# Patient Record
Sex: FEMALE | Race: WHITE | Employment: FULL TIME | URBAN - METROPOLITAN AREA
[De-identification: names, ages, dates, MRNs, and addresses within clinical notes are randomized per-mention and may not be internally consistent; named-entity substitution may affect disease eponyms.]

---

## 2017-06-01 ENCOUNTER — OFFICE VISIT (OUTPATIENT)
Dept: OBGYN CLINIC | Age: 57
End: 2017-06-01

## 2017-06-01 ENCOUNTER — APPOINTMENT (OUTPATIENT)
Dept: MAMMOGRAPHY | Age: 57
End: 2017-06-01

## 2017-06-01 VITALS
WEIGHT: 142.6 LBS | BODY MASS INDEX: 26.92 KG/M2 | HEIGHT: 61 IN | RESPIRATION RATE: 18 BRPM | SYSTOLIC BLOOD PRESSURE: 130 MMHG | DIASTOLIC BLOOD PRESSURE: 76 MMHG

## 2017-06-01 DIAGNOSIS — Z01.419 ENCOUNTER FOR GYNECOLOGICAL EXAMINATION (GENERAL) (ROUTINE) WITHOUT ABNORMAL FINDINGS: Primary | ICD-10-CM

## 2017-06-01 DIAGNOSIS — Z12.31 ENCOUNTER FOR SCREENING MAMMOGRAM FOR MALIGNANT NEOPLASM OF BREAST: ICD-10-CM

## 2017-06-01 DIAGNOSIS — B00.9 HSV INFECTION: ICD-10-CM

## 2017-06-01 RX ORDER — VALACYCLOVIR HYDROCHLORIDE 1 G/1
TABLET, FILM COATED ORAL
Qty: 30 TAB | Refills: 0 | Status: SHIPPED | OUTPATIENT
Start: 2017-06-01 | End: 2018-07-03 | Stop reason: SDUPTHER

## 2017-06-01 RX ORDER — LEVOTHYROXINE SODIUM 25 UG/1
TABLET ORAL
COMMUNITY
End: 2021-07-27

## 2017-06-01 NOTE — PROGRESS NOTES
[facility]  [email or web address]  [phone]    Tin Kunz MD, [facility]       Annual Gynecologic Exam:  WWE s/p josesito  Chief Complaint   Patient presents with    Well Woman       Maira Escalante is a 61 y.o.  [facility]  female who is here for an annual exam.    She does not report additional concerns today. Menstrual status:  Her periods are absent with a history of a hysterectomy. Contraception:  The current method of family planning is a hysterectomy. Sexual history:  She does not reports new sexual partner(s) in the last year. Preventive Medicine History:  Her last annual GYN exam was about one year ago. Her most recent Pap smear was normal obtained 1 year(s) ago. Her most recent HR HPV screen was Negative obtained 1 year(s) ago. She reports she does not know have a history of LANE 2, 3 or cervical cancer. Breast health: The patient had her mammogram today in our office. She does have a first or second degree relative with a history of breast cancer. Bone health: [place] She does not have a history of osteopenia/osteoporosis. Osteoporosis family history updated: see FH. She is not currently taking calcium and vitamin D.       Past Medical History:   Diagnosis Date    Abnormal uterine bleeding     H/O mammogram 2016    negative    History of mammogram 13; 5/29/15    negative; Negative    HSV (herpes simplex virus) infection     oral     Hypothyroidism     Pap smear for cervical cancer screening 4/20/10,5/29/15    negative, HPV negative; Neg HPV + #16    Pap smear for cervical cancer screening 2016    negative, HPV negative    PUD (peptic ulcer disease)     stomach ulcer     Past Surgical History:   Procedure Laterality Date    HX COLPOSCOPY  6/25/15    Pathology benign with inflammation    HX GYN  10/2010    partial hysterectomy,bladder prolapse     HX HERNIA REPAIR      ventral hernia repair    HX OTHER SURGICAL      liver biopsy, benign    HX OTHER SURGICAL      Laparoscopy, X 2, ovarian cyst    HX OTHER SURGICAL  9/30/11    Fatty Lymphoma Excision     Family History   Problem Relation Age of Onset    Hypertension Mother     Other Father      ETOH    Breast Cancer Sister      age 39, metastatic, negative BRCA testing    Other Other      lymphoma, uncle     Social History   Substance Use Topics    Smoking status: Former Smoker    Smokeless tobacco: Never Used    Alcohol use 1.0 oz/week     1 Glasses of wine, 1 Shots of liquor per week      Comment: social     Allergies   Allergen Reactions    Aspirin Other (comments)     \"stomach pain\"    Erythromycin Rash     In childhood    Penicillins Rash     As a child    Tetracycline Nausea and Vomiting     Current Outpatient Prescriptions   Medication Sig    levothyroxine (SYNTHROID) 25 mcg tablet Take  by mouth Daily (before breakfast).  valACYclovir (VALTREX) 1 gram tablet Take one tablet by mouth every 12 hours    multivitamin, stress formula (STRESS TAB) tablet Take 1 Tab by mouth daily.  LINACLOTIDE (LINZESS PO) Take  by mouth.  PV W-O BENITEZ/FERROUS FUMARATE/FA (M-VIT PO) Take 1 Tab by mouth daily. No current facility-administered medications for this visit. Tobacco History:  reports that she has quit smoking. She has never used smokeless tobacco.  Alcohol Abuse:  reports that she drinks about 1.0 oz of alcohol per week   Drug Abuse:  reports that she does not use illicit drugs.     Patient Active Problem List   Diagnosis Code    Meniscus tear S83.209A       Review of Systems - History obtained from the patient  Constitutional: negative for weight loss, fever, night sweats  HEENT: negative for hearing loss, earache, congestion, snoring, sorethroat  CV: negative for chest pain, palpitations, edema  Resp: negative for cough, shortness of breath, wheezing  GI: negative for change in bowel habits, abdominal pain, black or bloody stools  : negative for frequency, dysuria, hematuria, vaginal discharge  MSK: negative for back pain, joint pain, muscle pain  Breast: negative for breast lumps, nipple discharge, galactorrhea  Skin :negative for itching, rash, hives  Neuro: negative for dizziness, headache, confusion, weakness  Psych: negative for anxiety, depression, change in mood  Heme/lymph: negative for bleeding, bruising, pallor    Physical Exam  Visit Vitals    /76 (BP 1 Location: Left arm, BP Patient Position: Sitting)    Resp 18    Ht 5' 1\" (1.549 m)    Wt 142 lb 9.6 oz (64.7 kg)    BMI 26.94 kg/m2       Constitutional  · Appearance: well-nourished, well developed, alert, in no acute distress    HENT  · Head and Face: appears normal    Neck  · Inspection/Palpation: normal appearance, no masses or tenderness  · Lymph Nodes: no lymphadenopathy present  · Thyroid: gland size normal, nontender, no nodules or masses present on palpation    Chest  · Respiratory Effort: breathing labored  · Auscultation: normal breath sounds    Cardiovascular  · Heart:  · Auscultation: regular rate and rhythm without murmur    Breasts  · Inspection of Breasts: breasts symmetrical, no skin changes, no discharge present, nipple appearance normal, no skin retraction present  · Palpation of Breasts and Axillae: no masses present on palpation, no breast tenderness  · Axillary Lymph Nodes: no lymphadenopathy present    Gastrointestinal  · Abdominal Examination: abdomen non-tender to palpation, normal bowel sounds, no masses present  · Liver and spleen: no hepatomegaly present, spleen not palpable  · Hernias: no hernias identified    Genitourinary  · External Genitalia: normal appearance for age, no discharge present, no tenderness present, no inflammatory lesions present, no masses present, no atrophy present  · Vagina: normal vaginal vault without central or paravaginal defects, no discharge present, no inflammatory lesions present, no masses present  · Bladder: non-tender to palpation  · Urethra: appears normal  · Cervix: present   · Uterus: absent  · Adnexa: no adnexal tenderness present, no adnexal masses present  · Perineum: perineum within normal limits, no evidence of trauma, no rashes or skin lesions present  · Anus: anus within normal limits, no hemorrhoids present  · Inguinal Lymph Nodes: no lymphadenopathy present    Skin  · General Inspection: no rash, no lesions identified    Neurologic/Psychiatric  · Mental Status:  · Orientation: grossly oriented to person, place and time  · Mood and Affect: mood normal, affect appropriate    Assessment:  Encounter Diagnoses   Name Primary?  HSV infection     Encounter for gynecological examination (general) (routine) without abnormal findings Yes       Plan:  The patient was counseled about diet, exercise, healthy lifestyle  We recommended the patient return for yearly wellness visits  We discussed self breast exam  We discussed safer sex practices, condom use and risk factors for sexually transmitted diseases. We discussed current pap smear and HR HPV testing guidelines. Handouts were given to the patient. She is instructed to contact our office with any questions or concerns  We recommend repeating her annual exam in 1 year or following up sooner if needed  Handouts were given to the patient  We recommend follow up with a primary care physician for chronic medical problems and evaluation of non-gynecologic concerns. Orders Placed This Encounter    valACYclovir (VALTREX) 1 gram tablet    PAP IG, HPV AND RFX HPV 30/21,79([id number])       Results for orders placed or performed in visit on 06/01/17   [facility] MAMMO BI SCREENING INCL CAD    Narrative    STUDY: Bilateral digital screening mammogram    INDICATION:  Screening. COMPARISON:  [id number]    BREAST COMPOSITION:  There are scattered areas of fibroglandular density.     FINDINGS: Bilateral digital screening mammography was performed and is  interpreted in conjunction with a computer assisted detection (CAD) system. No  suspicious masses or calcifications are identified. There has been no  significant change. Impression    IMPRESSION:  BI-RADS 1: Negative. No mammographic evidence of malignancy. RECOMMENDATIONS:  Next screening mammogram is recommended in one year. The patient will be notified of these results.

## 2017-06-01 NOTE — PATIENT INSTRUCTIONS
Well Visit, Women 48 to 72: Care Instructions  Your Care Instructions  Physical exams can help you stay healthy. Your doctor has checked your overall health and may have suggested ways to take good care of yourself. He or she also may have recommended tests. At home, you can help prevent illness with healthy eating, regular exercise, and other steps. Follow-up care is a key part of your treatment and safety. Be sure to make and go to all appointments, and call your doctor if you are having problems. It's also a good idea to know your test results and keep a list of the medicines you take. How can you care for yourself at home? · Reach and stay at a healthy weight. This will lower your risk for many problems, such as obesity, diabetes, heart disease, and high blood pressure. · Get at least 30 minutes of exercise on most days of the week. Walking is a good choice. You also may want to do other activities, such as running, swimming, cycling, or playing tennis or team sports. · Do not smoke. Smoking can make health problems worse. If you need help quitting, talk to your doctor about stop-smoking programs and medicines. These can increase your chances of quitting for good. · Protect your skin from too much sun. When you're outdoors from 10 a.m. to 4 p.m., stay in the shade or cover up with clothing and a hat with a wide brim. Wear sunglasses that block UV rays. Even when it's cloudy, put broad-spectrum sunscreen (SPF 30 or higher) on any exposed skin. · See a dentist one or two times a year for checkups and to have your teeth cleaned. · Wear a seat belt in the car. · Limit alcohol to 1 drink a day. Too much alcohol can cause health problems. Follow your doctor's advice about when to have certain tests. These tests can spot problems early. · Cholesterol.  Your doctor will tell you how often to have this done based on your age, family history, or other things that can increase your risk for heart attack and stroke. · Blood pressure. Have your blood pressure checked during a routine doctor visit. Your doctor will tell you how often to check your blood pressure based on your age, your blood pressure results, and other factors. · Mammogram. Ask your doctor how often you should have a mammogram, which is an X-ray of your breasts. A mammogram can spot breast cancer before it can be felt and when it is easiest to treat. · Pap test and pelvic exam. Ask your doctor how often you should have a Pap test. You may not need to have a Pap test as often as you used to. · Vision. Have your eyes checked every year or two or as often as your doctor suggests. Some experts recommend that you have yearly exams for glaucoma and other age-related eye problems starting at age 48. · Hearing. Tell your doctor if you notice any change in your hearing. You can have tests to find out how well you hear. · Diabetes. Ask your doctor whether you should have tests for diabetes. · Colon cancer. You should begin tests for colon cancer at age 48. You may have one of several tests. Your doctor will tell you how often to have tests based on your age and risk. Risks include whether you already had a precancerous polyp removed from your colon or whether your parents, sisters and brothers, or children have had colon cancer. · Thyroid disease. Talk to your doctor about whether to have your thyroid checked as part of a regular physical exam. Women have an increased chance of a thyroid problem. · Osteoporosis. You should begin tests for bone density at age 72. If you are younger than 72, ask your doctor whether you have factors that may increase your risk for this disease. You may want to have this test before age 72. · Heart attack and stroke risk. At least every 4 to 6 years, you should have your risk for heart attack and stroke assessed.  Your doctor uses factors such as your age, blood pressure, cholesterol, and whether you smoke or have diabetes to show what your risk for a heart attack or stroke is over the next 10 years. When should you call for help? Watch closely for changes in your health, and be sure to contact your doctor if you have any problems or symptoms that concern you. Where can you learn more? Go to http://shanna-zoe.info/. Enter L050 in the search box to learn more about \"Well Visit, Women 50 to 72: Care Instructions. \"  Current as of: July 19, 2016  Content Version: 11.2  © 8373-0577 [employer]. Care instructions adapted under license by [employer] (which disclaims liability or warranty for this information). If you have questions about a medical condition or this instruction, always ask your healthcare professional. [address] any warranty or liability for your use of this information.

## 2017-07-13 LAB
CYTOLOGIST CVX/VAG CYTO: NORMAL
CYTOLOGY CVX/VAG DOC THIN PREP: NORMAL
CYTOLOGY HISTORY:: NORMAL
DX ICD CODE: NORMAL
HPV I/H RISK 1 DNA CVX QL PROBE+SIG AMP: NEGATIVE
Lab: NORMAL
OTHER STN SPEC: NORMAL
PATH REPORT.FINAL DX SPEC: NORMAL
STAT OF ADQ CVX/VAG CYTO-IMP: NORMAL

## 2018-07-03 ENCOUNTER — OFFICE VISIT (OUTPATIENT)
Dept: OBGYN CLINIC | Age: 58
End: 2018-07-03

## 2018-07-03 VITALS
WEIGHT: 148 LBS | DIASTOLIC BLOOD PRESSURE: 82 MMHG | BODY MASS INDEX: 27.94 KG/M2 | SYSTOLIC BLOOD PRESSURE: 140 MMHG | HEIGHT: 61 IN

## 2018-07-03 DIAGNOSIS — B00.9 HSV INFECTION: ICD-10-CM

## 2018-07-03 DIAGNOSIS — Z01.419 ENCOUNTER FOR GYNECOLOGICAL EXAMINATION (GENERAL) (ROUTINE) WITHOUT ABNORMAL FINDINGS: Primary | ICD-10-CM

## 2018-07-03 DIAGNOSIS — R03.0 ELEVATED BLOOD PRESSURE READING: ICD-10-CM

## 2018-07-03 RX ORDER — VALACYCLOVIR HYDROCHLORIDE 1 G/1
TABLET, FILM COATED ORAL
Qty: 30 TAB | Refills: 0 | Status: SHIPPED | OUTPATIENT
Start: 2018-07-03 | End: 2020-07-21 | Stop reason: SDUPTHER

## 2018-07-03 RX ORDER — THYROID, PORCINE 15 MG/1
TABLET ORAL
COMMUNITY
Start: 2018-07-01 | End: 2021-07-27

## 2018-07-03 RX ORDER — VALSARTAN 80 MG/1
TABLET ORAL
Refills: 5 | COMMUNITY
Start: 2018-06-23 | End: 2021-07-27

## 2018-07-03 NOTE — LETTER
7/3/2018 2:26 PM 
 
Ms. Arben Luis [address] [id number] To Whom it may concern; Arben Luis is under my care. She was seen in our office today for her annual well women exam and mammogram. 
 
Following this appointment today, she may return to work. Sincerely, Ruddy Sanz MD

## 2018-07-03 NOTE — MR AVS SNAPSHOT
[address] [facility] [address] [facility] 
[phone] Patient: Ramón Vicente MRN: [id number] :1960 Visit Information Date & Time Provider Department Dept. Phone Encounter #  
 7/3/2018  1:40 PM Rosio Basurto MD Ar[facility] Little [phone] [id number] Follow-up Instructions Return in about 1 year (around 7/3/2019), or if symptoms worsen or fail to improve, for Annual exam. Upcoming Health Maintenance Date Due Hepatitis C Screening 1960 FOBT Q 1 YEAR AGE 50-75 2009 BREAST CANCER SCRN MAMMOGRAM 2018 Influenza Age 5 to Adult 2018 PAP AKA CERVICAL CYTOLOGY 2020 Allergies as of 7/3/2018  Review Complete On: 7/3/2018 By: Rosio Basurto MD  
  
 Severity Noted Reaction Type Reactions Aspirin  2011    Other (comments) \"stomach pain\" Erythromycin  2011    Rash In childhood Penicillins  2011    Rash As a child Tetracycline  2011    Nausea and Vomiting Current Immunizations  Never Reviewed No immunizations on file. Not reviewed this visit You Were Diagnosed With   
  
 Codes Comments Encounter for gynecological examination (general) (routine) without abnormal findings    -  Primary ICD-10-CM: E95.945 ICD-9-CM: V72.31   
 HSV infection     ICD-10-CM: B00.9 ICD-9-CM: 054.9 Vitals BP Height(growth percentile) Weight(growth percentile) BMI OB Status Smoking Status 140/82 5' 1\" (1.549 m) 148 lb (67.1 kg) 27.96 kg/m2 Hysterectomy Former Smoker BMI and BSA Data Body Mass Index Body Surface Area  
 27.96 kg/m 2 1.7 m 2 Preferred Pharmacy Pharmacy Name Phone [id number] [place] [facility], [address] [phone] Your Updated Medication List  
  
   
This list is accurate as of 7/3/18  2:08 PM.  Always use your most recent med list.  
  
  
  
  
 BERNADETTE THYROID 15 mg tablet Generic drug:  thyroid (Pork) levothyroxine 25 mcg tablet Commonly known as:  SYNTHROID Take  by mouth Daily (before breakfast). LINZESS PO Take  by mouth. M-VIT PO Take 1 Tab by mouth daily. multivitamin, stress formula tablet Commonly known as:  STRESS TAB Take 1 Tab by mouth daily. valACYclovir 1 gram tablet Commonly known as:  VALTREX Take one tablet by mouth every 12 hours  
  
 valsartan 80 mg tablet Commonly known as:  DIOVAN  
TAKE 1 TABLET BY MOUTH EVERY DAY Prescriptions Sent to Pharmacy Refills  
 valACYclovir (VALTREX) 1 gram tablet 0 Sig: Take one tablet by mouth every 12 hours Class: Normal  
 Pharmacy: [address] [place] [address], [address] [place] [address] Road Ph #: [phone] We Performed the Following PAP IG, HPV AND RFX HPV 47/71,35([id number]) [[id number] Custom] Follow-up Instructions Return in about 1 year (around 7/3/2019), or if symptoms worsen or fail to improve, for Annual exam.  
  
  
Patient Instructions Well Visit, Women 48 to 72: Care Instructions Your Care Instructions Physical exams can help you stay healthy. Your doctor has checked your overall health and may have suggested ways to take good care of yourself. He or she also may have recommended tests. At home, you can help prevent illness with healthy eating, regular exercise, and other steps. Follow-up care is a key part of your treatment and safety. Be sure to make and go to all appointments, and call your doctor if you are having problems. It's also a good idea to know your test results and keep a list of the medicines you take. How can you care for yourself at home? · Reach and stay at a healthy weight. This will lower your risk for many problems, such as obesity, diabetes, heart disease, and high blood pressure. · Get at least 30 minutes of exercise on most days of the week.  Walking is a good choice. You also may want to do other activities, such as running, swimming, cycling, or playing tennis or team sports. · Do not smoke. Smoking can make health problems worse. If you need help quitting, talk to your doctor about stop-smoking programs and medicines. These can increase your chances of quitting for good. · Protect your skin from too much sun. When you're outdoors from 10 a.m. to 4 p.m., stay in the shade or cover up with clothing and a hat with a wide brim. Wear sunglasses that block UV rays. Even when it's cloudy, put broad-spectrum sunscreen (SPF 30 or higher) on any exposed skin. · See a dentist one or two times a year for checkups and to have your teeth cleaned. · Wear a seat belt in the car. · Limit alcohol to 1 drink a day. Too much alcohol can cause health problems. Follow your doctor's advice about when to have certain tests. These tests can spot problems early. · Cholesterol. Your doctor will tell you how often to have this done based on your age, family history, or other things that can increase your risk for heart attack and stroke. · Blood pressure. Have your blood pressure checked during a routine doctor visit. Your doctor will tell you how often to check your blood pressure based on your age, your blood pressure results, and other factors. · Mammogram. Ask your doctor how often you should have a mammogram, which is an X-ray of your breasts. A mammogram can spot breast cancer before it can be felt and when it is easiest to treat. · Pap test and pelvic exam. Ask your doctor how often you should have a Pap test. You may not need to have a Pap test as often as you used to. · Vision. Have your eyes checked every year or two or as often as your doctor suggests. Some experts recommend that you have yearly exams for glaucoma and other age-related eye problems starting at age 48. · Hearing. Tell your doctor if you notice any change in your hearing.  You can have tests to find out how well you hear. · Diabetes. Ask your doctor whether you should have tests for diabetes. · Colon cancer. You should begin tests for colon cancer at age 48. You may have one of several tests. Your doctor will tell you how often to have tests based on your age and risk. Risks include whether you already had a precancerous polyp removed from your colon or whether your parents, sisters and brothers, or children have had colon cancer. · Thyroid disease. Talk to your doctor about whether to have your thyroid checked as part of a regular physical exam. Women have an increased chance of a thyroid problem. · Osteoporosis. You should begin tests for bone density at age 72. If you are younger than 72, ask your doctor whether you have factors that may increase your risk for this disease. You may want to have this test before age 72. · Heart attack and stroke risk. At least every 4 to 6 years, you should have your risk for heart attack and stroke assessed. Your doctor uses factors such as your age, blood pressure, cholesterol, and whether you smoke or have diabetes to show what your risk for a heart attack or stroke is over the next 10 years. When should you call for help? Watch closely for changes in your health, and be sure to contact your doctor if you have any problems or symptoms that concern you. Where can you learn more? Go to http://shanna-zoe.info/. Enter X743 in the search box to learn more about \"Well Visit, Women 50 to 72: Care Instructions. \" Current as of: May 12, 2017 Content Version: 11.4 © 2972-3389 [employer]. Care instructions adapted under license by [employer] (which disclaims liability or warranty for this information).  If you have questions about a medical condition or this instruction, always ask your healthcare professional. [address] [address] any warranty or liability for your use of this information. Introducing [facility]! Dear [facility]: Thank you for requesting a [employer] account. Our records indicate that you already have an active [employer] account. You can access your account anytime at [email or web address] Did you know that you can access your hospital and ER discharge instructions at any time in [employer]? You can also review all of your test results from your hospital stay or ER visit. Additional Information If you have questions, please visit the Frequently Asked Questions section of the [employer] website at [email or web address] Remember, [employer] is NOT to be used for urgent needs. For medical emergencies, dial 911. Now available from your iPhone and Android! Please provide this summary of care documentation to your next provider. Your primary care clinician is listed as NONE. If you have any questions after today's visit, please call [phone].

## 2018-07-03 NOTE — PATIENT INSTRUCTIONS
Well Visit, Women 48 to 72: Care Instructions  Your Care Instructions    Physical exams can help you stay healthy. Your doctor has checked your overall health and may have suggested ways to take good care of yourself. He or she also may have recommended tests. At home, you can help prevent illness with healthy eating, regular exercise, and other steps. Follow-up care is a key part of your treatment and safety. Be sure to make and go to all appointments, and call your doctor if you are having problems. It's also a good idea to know your test results and keep a list of the medicines you take. How can you care for yourself at home? · Reach and stay at a healthy weight. This will lower your risk for many problems, such as obesity, diabetes, heart disease, and high blood pressure. · Get at least 30 minutes of exercise on most days of the week. Walking is a good choice. You also may want to do other activities, such as running, swimming, cycling, or playing tennis or team sports. · Do not smoke. Smoking can make health problems worse. If you need help quitting, talk to your doctor about stop-smoking programs and medicines. These can increase your chances of quitting for good. · Protect your skin from too much sun. When you're outdoors from 10 a.m. to 4 p.m., stay in the shade or cover up with clothing and a hat with a wide brim. Wear sunglasses that block UV rays. Even when it's cloudy, put broad-spectrum sunscreen (SPF 30 or higher) on any exposed skin. · See a dentist one or two times a year for checkups and to have your teeth cleaned. · Wear a seat belt in the car. · Limit alcohol to 1 drink a day. Too much alcohol can cause health problems. Follow your doctor's advice about when to have certain tests. These tests can spot problems early. · Cholesterol.  Your doctor will tell you how often to have this done based on your age, family history, or other things that can increase your risk for heart attack and stroke. · Blood pressure. Have your blood pressure checked during a routine doctor visit. Your doctor will tell you how often to check your blood pressure based on your age, your blood pressure results, and other factors. · Mammogram. Ask your doctor how often you should have a mammogram, which is an X-ray of your breasts. A mammogram can spot breast cancer before it can be felt and when it is easiest to treat. · Pap test and pelvic exam. Ask your doctor how often you should have a Pap test. You may not need to have a Pap test as often as you used to. · Vision. Have your eyes checked every year or two or as often as your doctor suggests. Some experts recommend that you have yearly exams for glaucoma and other age-related eye problems starting at age 48. · Hearing. Tell your doctor if you notice any change in your hearing. You can have tests to find out how well you hear. · Diabetes. Ask your doctor whether you should have tests for diabetes. · Colon cancer. You should begin tests for colon cancer at age 48. You may have one of several tests. Your doctor will tell you how often to have tests based on your age and risk. Risks include whether you already had a precancerous polyp removed from your colon or whether your parents, sisters and brothers, or children have had colon cancer. · Thyroid disease. Talk to your doctor about whether to have your thyroid checked as part of a regular physical exam. Women have an increased chance of a thyroid problem. · Osteoporosis. You should begin tests for bone density at age 72. If you are younger than 72, ask your doctor whether you have factors that may increase your risk for this disease. You may want to have this test before age 72. · Heart attack and stroke risk. At least every 4 to 6 years, you should have your risk for heart attack and stroke assessed.  Your doctor uses factors such as your age, blood pressure, cholesterol, and whether you smoke or have diabetes to show what your risk for a heart attack or stroke is over the next 10 years. When should you call for help? Watch closely for changes in your health, and be sure to contact your doctor if you have any problems or symptoms that concern you. Where can you learn more? Go to http://shanna-zoe.info/. Enter W146 in the search box to learn more about \"Well Visit, Women 50 to 72: Care Instructions. \"  Current as of: May 12, 2017  Content Version: 11.4  © 2820-2091 Healthwise, Incorporated. Care instructions adapted under license by [employer] (which disclaims liability or warranty for this information). If you have questions about a medical condition or this instruction, always ask your healthcare professional. [address] any warranty or liability for your use of this information.

## 2018-07-05 ENCOUNTER — TELEPHONE (OUTPATIENT)
Dept: OBGYN CLINIC | Age: 58
End: 2018-07-05

## 2018-07-05 DIAGNOSIS — R92.8 ABNORMAL MAMMOGRAM OF LEFT BREAST: Primary | ICD-10-CM

## 2018-07-05 NOTE — TELEPHONE ENCOUNTER
----- Message from Rocio North MD sent at 7/3/2018  5:47 PM EDT -----  Abnormal, needs follow up. Tickle. Update FS. Notify patient if not done by radiology.

## 2018-07-05 NOTE — TELEPHONE ENCOUNTER
Patient aware of results and MD recommendations by phone. Patient was explained the abnormality of the mammogram and what the next steps to take. Patient wants to contact her insurance company to make sure that this diagnostic mammogram and ultrasound is covered and then I provided the patient the  Coordination of Care scheduling number and alerted the patient that she would need to have the mammogram done at the [address] [place] [address]. I also advised the patient that I would place the needed orders in her chart so that when she calls to schedule the appointment, the orders would be seen and noted. Patient appreciative of the help. Orders placed within the patient's chart.

## 2018-07-09 ENCOUNTER — HOSPITAL ENCOUNTER (OUTPATIENT)
Dept: MAMMOGRAPHY | Age: 58
Discharge: HOME | End: 2018-07-09
Attending: OBSTETRICS & GYNECOLOGY
Payer: COMMERCIAL

## 2018-07-09 DIAGNOSIS — R92.8 ABNORMAL MAMMOGRAM OF LEFT BREAST: ICD-10-CM

## 2018-07-09 DIAGNOSIS — R92.8 ABNORMAL MAMMOGRAM: ICD-10-CM

## 2018-07-09 PROCEDURE — 76642 ULTRASOUND BREAST LIMITED: CPT

## 2018-07-09 PROCEDURE — 77065 DX MAMMO INCL CAD UNI: CPT

## 2018-07-09 NOTE — PROGRESS NOTES
Abnormal results: breast cyst an 8 mm x 4 mm x 6 mm  cyst, correlating with mammographic abnormality. Left US  Update chart (if needed).

## 2018-10-29 ENCOUNTER — HOSPITAL ENCOUNTER (OUTPATIENT)
Dept: MAMMOGRAPHY | Age: 58
Discharge: HOME | End: 2018-10-29
Attending: INTERNAL MEDICINE
Payer: COMMERCIAL

## 2018-10-29 DIAGNOSIS — Z78.0 ASYMPTOMATIC MENOPAUSAL STATE: ICD-10-CM

## 2018-10-29 PROCEDURE — 77080 DXA BONE DENSITY AXIAL: CPT

## 2019-07-16 ENCOUNTER — OFFICE VISIT (OUTPATIENT)
Dept: OBGYN CLINIC | Age: 59
End: 2019-07-16

## 2019-07-16 VITALS
HEIGHT: 61 IN | DIASTOLIC BLOOD PRESSURE: 74 MMHG | WEIGHT: 154 LBS | BODY MASS INDEX: 29.07 KG/M2 | SYSTOLIC BLOOD PRESSURE: 126 MMHG

## 2019-07-16 DIAGNOSIS — Z01.419 ENCOUNTER FOR GYNECOLOGICAL EXAMINATION (GENERAL) (ROUTINE) WITHOUT ABNORMAL FINDINGS: Primary | ICD-10-CM

## 2019-07-16 DIAGNOSIS — Z71.1 WORRIED WELL: ICD-10-CM

## 2019-07-16 LAB
BILIRUB UR QL STRIP: NEGATIVE
GLUCOSE UR-MCNC: NEGATIVE MG/DL
KETONES P FAST UR STRIP-MCNC: NEGATIVE MG/DL
PH UR STRIP: 6.5 [PH] (ref 4.6–8)
PROT UR QL STRIP: NEGATIVE
SP GR UR STRIP: 1.02 (ref 1–1.03)
UA UROBILINOGEN AMB POC: NORMAL (ref 0.2–1)
URINALYSIS CLARITY POC: CLEAR
URINALYSIS COLOR POC: YELLOW
URINE BLOOD POC: NEGATIVE
URINE LEUKOCYTES POC: NEGATIVE
URINE NITRITES POC: NEGATIVE

## 2019-07-16 NOTE — PATIENT INSTRUCTIONS
Well Visit, Women 48 to 72: Care Instructions Your Care Instructions Physical exams can help you stay healthy. Your doctor has checked your overall health and may have suggested ways to take good care of yourself. He or she also may have recommended tests. At home, you can help prevent illness with healthy eating, regular exercise, and other steps. Follow-up care is a key part of your treatment and safety. Be sure to make and go to all appointments, and call your doctor if you are having problems. It's also a good idea to know your test results and keep a list of the medicines you take. How can you care for yourself at home? · Reach and stay at a healthy weight. This will lower your risk for many problems, such as obesity, diabetes, heart disease, and high blood pressure. · Get at least 30 minutes of exercise on most days of the week. Walking is a good choice. You also may want to do other activities, such as running, swimming, cycling, or playing tennis or team sports. · Do not smoke. Smoking can make health problems worse. If you need help quitting, talk to your doctor about stop-smoking programs and medicines. These can increase your chances of quitting for good. · Protect your skin from too much sun. When you're outdoors from 10 a.m. to 4 p.m., stay in the shade or cover up with clothing and a hat with a wide brim. Wear sunglasses that block UV rays. Even when it's cloudy, put broad-spectrum sunscreen (SPF 30 or higher) on any exposed skin. · See a dentist one or two times a year for checkups and to have your teeth cleaned. · Wear a seat belt in the car. · Limit alcohol to 1 drink a day. Too much alcohol can cause health problems. Follow your doctor's advice about when to have certain tests. These tests can spot problems early. · Cholesterol.  Your doctor will tell you how often to have this done based on your age, family history, or other things that can increase your risk for heart attack and stroke. · Blood pressure. Have your blood pressure checked during a routine doctor visit. Your doctor will tell you how often to check your blood pressure based on your age, your blood pressure results, and other factors. · Mammogram. Ask your doctor how often you should have a mammogram, which is an X-ray of your breasts. A mammogram can spot breast cancer before it can be felt and when it is easiest to treat. · Pap test and pelvic exam. Ask your doctor how often you should have a Pap test. You may not need to have a Pap test as often as you used to. · Vision. Have your eyes checked every year or two or as often as your doctor suggests. Some experts recommend that you have yearly exams for glaucoma and other age-related eye problems starting at age 48. · Hearing. Tell your doctor if you notice any change in your hearing. You can have tests to find out how well you hear. · Diabetes. Ask your doctor whether you should have tests for diabetes. · Colon cancer. You should begin tests for colon cancer at age 48. You may have one of several tests. Your doctor will tell you how often to have tests based on your age and risk. Risks include whether you already had a precancerous polyp removed from your colon or whether your parents, sisters and brothers, or children have had colon cancer. · Thyroid disease. Talk to your doctor about whether to have your thyroid checked as part of a regular physical exam. Women have an increased chance of a thyroid problem. · Osteoporosis. You should begin tests for bone density at age 72. If you are younger than 72, ask your doctor whether you have factors that may increase your risk for this disease. You may want to have this test before age 72. · Heart attack and stroke risk. At least every 4 to 6 years, you should have your risk for heart attack and stroke assessed.  Your doctor uses factors such as your age, blood pressure, cholesterol, and whether you smoke or have diabetes to show what your risk for a heart attack or stroke is over the next 10 years. When should you call for help? Watch closely for changes in your health, and be sure to contact your doctor if you have any problems or symptoms that concern you. Where can you learn more? Go to http://shanna-zoe.info/. Enter U930 in the search box to learn more about \"Well Visit, Women 50 to 72: Care Instructions. \" Current as of: March 28, 2018 Content Version: 11.9 © 6598-8655 Healthwise, Incorporated. Care instructions adapted under license by [employer] (which disclaims liability or warranty for this information). If you have questions about a medical condition or this instruction, always ask your healthcare professional. [address] any warranty or liability for your use of this information.

## 2019-07-16 NOTE — PROGRESS NOTES
[facility]  [email or web address]  [phone]    Agnieszka Resendez MD, [facility]       Annual Gynecologic Exam:  WWE s/p hyst  Chief Complaint   Patient presents with    Well Woman       Alireza Saxena is a 60 y.o.  [facility]  female who is here for an annual exam.    She does not report additional concerns today. Concerned about bulge in upper abdomen. Menstrual status:  Her periods are absent with a history of a hysterectomy. Contraception:  The current method of family planning is a hysterectomy. Sexual history:  She does not reports new sexual partner(s) in the last year. Preventive Medicine History:. Her most recent Pap smear was normal obtained 2018  Her most recent HR HPV screen was Negative obtained [id number]   She reports she does not have a history of [address], 3 or cervical cancer. Breast health: The patient had her mammogram today in our office. She does have a first or second degree relative with a history of breast cancer. Sister- metastatic Br Ca-  BRCA neg  Bone health: Evie Juarez She does have a history of osteopenia. Osteoporosis family history updated: see FH. She is currently taking calcium and vitamin D.       Past Medical History:   Diagnosis Date    Abnormal mammogram of left breast 2018; 18    add'l views MMG birads 2, left    Abnormal ultrasound of breast 2018    Left, breast cyst an 8 mm x 4 mm x 6 mm    Abnormal uterine bleeding     H/O mammogram 2016    negative    History of mammogram 13; 5/29/15; 17    negative; Negative; negative    HSV (herpes simplex virus) infection     oral     Hypothyroidism     Pap smear for cervical cancer screening 4/20/10,5/29/15    negative, HPV negative; Neg HPV + #16    Pap smear for cervical cancer screening 2016; 17; 7/3/18    negative, HPV negative; neg, hpv neg; Neg, HPV Neg    PUD (peptic ulcer disease)     stomach ulcer     Past Surgical History:   Procedure Laterality Date    HX COLPOSCOPY  6/25/15    Pathology benign with inflammation    HX GYN  10/2010    partial hysterectomy,bladder prolapse     HX HERNIA REPAIR  2003    ventral hernia repair    HX OTHER SURGICAL      liver biopsy, benign    HX OTHER SURGICAL      Laparoscopy, X 2, ovarian cyst    HX OTHER SURGICAL  9/30/11    Fatty Lymphoma Excision     Family History   Problem Relation Age of Onset    Hypertension Mother     Other Father         ETOH    Breast Cancer Sister         age 39, metastatic, negative BRCA testing    Other Other         lymphoma, uncle     Social History     Tobacco Use    Smoking status: Former Smoker    Smokeless tobacco: Never Used   Substance Use Topics    Alcohol use: Yes     Alcohol/week: 1.0 oz     Types: 1 Glasses of wine, 1 Shots of liquor per week     Comment: social    Drug use: No     Allergies   Allergen Reactions    Aspirin Other (comments)     \"stomach pain\"    Erythromycin Rash     In childhood    Penicillins Rash     As a child    Tetracycline Nausea and Vomiting     Current Outpatient Medications   Medication Sig    ARMOUR THYROID 15 mg tablet     valsartan (DIOVAN) 80 mg tablet TAKE 1 TABLET BY MOUTH EVERY DAY    valACYclovir (VALTREX) 1 gram tablet Take one tablet by mouth every 12 hours    levothyroxine (SYNTHROID) 25 mcg tablet Take  by mouth Daily (before breakfast).  multivitamin, stress formula (STRESS TAB) tablet Take 1 Tab by mouth daily.  LINACLOTIDE (LINZESS PO) Take  by mouth.  PV W-O BENITEZ/FERROUS FUMARATE/FA (M-VIT PO) Take 1 Tab by mouth daily. No current facility-administered medications for this visit. Tobacco History:  reports that she has quit smoking. She has never used smokeless tobacco.  Alcohol Abuse:  reports that she drinks about 1.0 oz of alcohol per week. Drug Abuse:  reports that she does not use drugs.     Patient Active Problem List   Diagnosis Code    Meniscus tear S83.209A    HSV infection B00.9       Review of Systems - History obtained from the patient  Constitutional: negative for weight loss, fever, night sweats  HEENT: negative for hearing loss, earache, congestion, snoring, sorethroat  CV: negative for chest pain, palpitations, edema  Resp: negative for cough, shortness of breath, wheezing  GI: negative for change in bowel habits, abdominal pain, black or bloody stools  : negative for frequency, dysuria, hematuria, vaginal discharge  MSK: negative for back pain, joint pain, muscle pain  Breast: negative for breast lumps, nipple discharge, galactorrhea  Skin :negative for itching, rash, hives  Neuro: negative for dizziness, headache, confusion, weakness  Psych: negative for anxiety, depression, change in mood  Heme/lymph: negative for bleeding, bruising, pallor    Physical Exam  Visit Vitals  /74   Ht 5' 1\" (1.549 m)   Wt 154 lb (69.9 kg)   BMI 29.10 kg/m²       Constitutional  · Appearance: well-nourished, well developed, alert, in no acute distress    HENT  · Head and Face: appears normal    Neck  · Inspection/Palpation: normal appearance, no masses or tenderness  · Lymph Nodes: no lymphadenopathy present  · Thyroid: gland size normal, nontender, no nodules or masses present on palpation    Chest  · Respiratory Effort: breathing unlabored  · Auscultation: normal breath sounds    Cardiovascular  · Heart:  · Auscultation: regular rate and rhythm without murmur    Breasts  · Inspection of Breasts: breasts symmetrical, no skin changes, no discharge present, nipple appearance normal, no skin retraction present  · Palpation of Breasts and Axillae: no masses present on palpation, no breast tenderness  · Axillary Lymph Nodes: no lymphadenopathy present    Gastrointestinal  · Abdominal Examination: abdomen non-tender to palpation, normal bowel sounds, no masses present  · Liver and spleen: no hepatomegaly present, spleen not palpable  · Hernias: no hernias identified    Genitourinary  · External Genitalia: normal appearance for age, no discharge present, no tenderness present, no inflammatory lesions present, no masses present, no atrophy present  · Vagina: normal vaginal vault without central or paravaginal defects, no discharge present, no inflammatory lesions present, no masses present  · Bladder: non-tender to palpation  · Urethra: appears normal  · Cervix: present   · Uterus: absent  · Adnexa: no adnexal tenderness present, no adnexal masses present  · Perineum: perineum within normal limits, no evidence of trauma, no rashes or skin lesions present  · Anus: anus within normal limits, no hemorrhoids present  · Inguinal Lymph Nodes: no lymphadenopathy present    Skin  · General Inspection: no rash, no lesions identified    Neurologic/Psychiatric  · Mental Status:  · Orientation: grossly oriented to person, place and time  · Mood and Affect: mood normal, affect appropriate    Assessment:  Encounter Diagnoses   Name Primary?  Worried well     Encounter for gynecological examination (general) (routine) without abnormal findings Yes       Plan:  The patient was counseled about diet, exercise, healthy lifestyle  We recommended the patient return for yearly wellness visits  We discussed self breast exam  We discussed safer sex practices, condom use and risk factors for sexually transmitted diseases. We discussed current pap smear and HR HPV testing guidelines. Handouts were given to the patient. She is instructed to contact our office with any questions or concerns  We recommend repeating her annual exam in 1 year or following up sooner if needed  Handouts were given to the patient  We recommend follow up with a primary care physician for chronic medical problems and evaluation of non-gynecologic concerns. Disc pap screening recs and recent normal pap neg hpv. Rec general surgery fu for upper abd complaints vs GI, pt has number for Dr. Marcelo Love.      Orders Placed This Encounter    AMB POC URINALYSIS DIP STICK MANUAL W/O MICRO       Results for orders placed or performed in visit on 07/16/19   AMB POC URINALYSIS DIP STICK MANUAL W/O MICRO   Result Value Ref Range    Color (UA POC) Yellow     Clarity (UA POC) Clear     Glucose (UA POC) Negative Negative    Bilirubin (UA POC) Negative Negative    Ketones (UA POC) Negative Negative    Specific gravity (UA POC) 1.020 1.001 - 1.035    Blood (UA POC) Negative Negative    pH (UA POC) 6.5 4.6 - 8.0    Protein (UA POC) Negative Negative    Urobilinogen (UA POC) normal 0.2 - 1    Nitrites (UA POC) Negative Negative    Leukocyte esterase (UA POC) Negative Negative   Results for orders placed or performed in visit on 07/16/19   [facility] 3D KATHY W MAMMO BI SCREENING INCL CAD    Narrative    STUDY: Bilateral digital screening mammogram with 3-D tomosynthesis    INDICATION:  Screening. COMPARISON: [id number]    BREAST COMPOSITION: There are scattered areas of fibroglandular density. FINDINGS: Bilateral digital screening mammography was performed and is  interpreted in conjunction with a computer assisted detection (CAD) system. Additionally, tomosynthesis of both breasts in the CC and MLO projections was  performed. No suspicious masses or calcifications are identified. There has been  no significant change. Impression    IMPRESSION:  BI-RADS 1: Negative. No mammographic evidence of malignancy. RECOMMENDATIONS:  Next screening mammogram is recommended in one year. The patient will be notified of these results.

## 2019-10-15 NOTE — PROGRESS NOTES
[facility]  [email or web address]  [phone]    Leland Mohr MD, [address] [facility]       Annual Gynecologic Exam:  WWE s/p hyst  Chief Complaint   Patient presents with    Well Woman       Jarvis Mckinney is a 61 y.o.  [facility]  female who is here for an annual exam.    She does report additional concerns today. Pt c/o back pain and thyroid problems and back pains. Menstrual status:  Her periods are absent with a history of a hysterectomy. Contraception:  The current method of family planning is a hysterectomy. Sexual history:  She does not reports new sexual partner(s) in the last year. Preventive Medicine History:. Her most recent Pap smear was normal obtained   Her most recent HR HPV screen was Negative obtained [id number]   She reports she does not have a history of LANE 2, 3 or cervical cancer. Breast health: The patient had a recent mammogram 17 which was negative for malignancy. She does have a first or second degree relative with a history of breast cancer. Bone health: He Cannon She does have a history of osteopenia/osteoporosis. Osteoporosis family history updated: see FH. She is not currently taking calcium and vitamin D.       Past Medical History:   Diagnosis Date    Abnormal uterine bleeding     H/O mammogram 2016    negative    History of mammogram 13; 5/29/15; 17    negative; Negative; negative    HSV (herpes simplex virus) infection     oral     Hypothyroidism     Pap smear for cervical cancer screening 4/20/10,5/29/15    negative, HPV negative; Neg HPV + #16    Pap smear for cervical cancer screening 2016; 17    negative, HPV negative; neg, hpv neg    PUD (peptic ulcer disease)     stomach ulcer     Past Surgical History:   Procedure Laterality Date    HX COLPOSCOPY  6/25/15    Pathology benign with inflammation    HX GYN  10/2010    partial hysterectomy,bladder prolapse     HX HERNIA REPAIR 2003    ventral hernia repair    HX OTHER SURGICAL      liver biopsy, benign    HX OTHER SURGICAL      Laparoscopy, X 2, ovarian cyst    HX OTHER SURGICAL  9/30/11    Fatty Lymphoma Excision     Family History   Problem Relation Age of Onset    Hypertension Mother     Other Father      ETOH    Breast Cancer Sister      age 39, metastatic, negative BRCA testing    Other Other      lymphoma, uncle     Social History   Substance Use Topics    Smoking status: Former Smoker    Smokeless tobacco: Never Used    Alcohol use 1.0 oz/week     1 Glasses of wine, 1 Shots of liquor per week      Comment: social     Allergies   Allergen Reactions    Aspirin Other (comments)     \"stomach pain\"    Erythromycin Rash     In childhood    Penicillins Rash     As a child    Tetracycline Nausea and Vomiting     Current Outpatient Prescriptions   Medication Sig    ARMOUR THYROID 15 mg tablet     valsartan (DIOVAN) 80 mg tablet TAKE 1 TABLET BY MOUTH EVERY DAY    valACYclovir (VALTREX) 1 gram tablet Take one tablet by mouth every 12 hours    multivitamin, stress formula (STRESS TAB) tablet Take 1 Tab by mouth daily.  levothyroxine (SYNTHROID) 25 mcg tablet Take  by mouth Daily (before breakfast).  LINACLOTIDE (LINZESS PO) Take  by mouth.  PV W-O BENITEZ/FERROUS FUMARATE/FA (M-VIT PO) Take 1 Tab by mouth daily. No current facility-administered medications for this visit. Tobacco History:  reports that she has quit smoking. She has never used smokeless tobacco.  Alcohol Abuse:  reports that she drinks about 1.0 oz of alcohol per week   Drug Abuse:  reports that she does not use illicit drugs.     Patient Active Problem List   Diagnosis Code    Meniscus tear S83.209A    HSV infection B00.9       Review of Systems - History obtained from the patient  Constitutional: negative for weight loss, fever, night sweats  HEENT: negative for hearing loss, earache, congestion, snoring, sorethroat  CV: negative for chest pain, palpitations, edema  Resp: negative for cough, shortness of breath, wheezing  GI: negative for change in bowel habits, abdominal pain, black or bloody stools  : negative for frequency, dysuria, hematuria, vaginal discharge  MSK: negative for back pain, joint pain, muscle pain  Breast: negative for breast lumps, nipple discharge, galactorrhea  Skin :negative for itching, rash, hives  Neuro: negative for dizziness, headache, confusion, weakness  Psych: negative for anxiety, depression, change in mood  Heme/lymph: negative for bleeding, bruising, pallor    Physical Exam  Visit Vitals    /82    Ht 5' 1\" (1.549 m)    Wt 148 lb (67.1 kg)    BMI 27.96 kg/m2       Constitutional  · Appearance: well-nourished, well developed, alert, in no acute distress    HENT  · Head and Face: appears normal    Neck  · Inspection/Palpation: normal appearance, no masses or tenderness  · Lymph Nodes: no lymphadenopathy present  · Thyroid: gland size normal, nontender, no nodules or masses present on palpation    Chest  · Respiratory Effort: breathing unlabored  · Auscultation: normal breath sounds    Cardiovascular  · Heart:  · Auscultation: regular rate and rhythm without murmur    Breasts  · Inspection of Breasts: breasts symmetrical, no skin changes, no discharge present, nipple appearance normal, no skin retraction present  · Palpation of Breasts and Axillae: no masses present on palpation, no breast tenderness  · Axillary Lymph Nodes: no lymphadenopathy present    Gastrointestinal  · Abdominal Examination: abdomen non-tender to palpation, normal bowel sounds, no masses present, ?  Fullness anterior midline, no focal mass  · Liver and spleen: no hepatomegaly present, spleen not palpable  · Hernias: no hernias identified    Genitourinary  · External Genitalia: normal appearance for age, no discharge present, no tenderness present, no inflammatory lesions present, no masses present, no atrophy present  · Vagina: normal vaginal vault without central or paravaginal defects, no discharge present, no inflammatory lesions present, no masses present  · Bladder: non-tender to palpation  · Urethra: appears normal  · Cervix: present   · Uterus: absent  · Adnexa: no adnexal tenderness present, no adnexal masses present  · Perineum: perineum within normal limits, no evidence of trauma, no rashes or skin lesions present  · Anus: anus within normal limits, no hemorrhoids present  · Inguinal Lymph Nodes: no lymphadenopathy present    Skin  · General Inspection: no rash, no lesions identified    Neurologic/Psychiatric  · Mental Status:  · Orientation: grossly oriented to person, place and time  · Mood and Affect: mood normal, affect appropriate    Assessment:  Encounter Diagnoses   Name Primary?  Encounter for gynecological examination (general) (routine) without abnormal findings Yes    HSV infection     Elevated blood pressure reading        Plan:  The patient was counseled about diet, exercise, healthy lifestyle  We recommended the patient return for yearly wellness visits  We discussed self breast exam  We discussed safer sex practices, condom use and risk factors for sexually transmitted diseases. We discussed current pap smear and HR HPV testing guidelines. Handouts were given to the patient. She is instructed to contact our office with any questions or concerns  We recommend repeating her annual exam in 1 year or following up sooner if needed  Handouts were given to the patient  We recommend follow up with a primary care physician for chronic medical problems and evaluation of non-gynecologic concerns. Rec surgery consult re concerns of hernia  Valtrex rf prn  Pap per pt req, disc can wait b/c last pap wnl x 2  pcp fu for repeat bp check    Orders Placed This Encounter    valACYclovir (VALTREX) 1 gram tablet    PAP IG, HPV AND RFX HPV 20/17,18([id number])       No results found for any visits on 07/03/18. independent

## 2020-07-20 NOTE — PATIENT INSTRUCTIONS
Well Visit, Women 48 to 72: Care Instructions  Your Care Instructions     Physical exams can help you stay healthy. Your doctor has checked your overall health and may have suggested ways to take good care of yourself. He or she also may have recommended tests. At home, you can help prevent illness with healthy eating, regular exercise, and other steps. Follow-up care is a key part of your treatment and safety. Be sure to make and go to all appointments, and call your doctor if you are having problems. It's also a good idea to know your test results and keep a list of the medicines you take. How can you care for yourself at home? · Reach and stay at a healthy weight. This will lower your risk for many problems, such as obesity, diabetes, heart disease, and high blood pressure. · Get at least 30 minutes of exercise on most days of the week. Walking is a good choice. You also may want to do other activities, such as running, swimming, cycling, or playing tennis or team sports. · Do not smoke. Smoking can make health problems worse. If you need help quitting, talk to your doctor about stop-smoking programs and medicines. These can increase your chances of quitting for good. · Protect your skin from too much sun. When you're outdoors from 10 a.m. to 4 p.m., stay in the shade or cover up with clothing and a hat with a wide brim. Wear sunglasses that block UV rays. Even when it's cloudy, put broad-spectrum sunscreen (SPF 30 or higher) on any exposed skin. · See a dentist one or two times a year for checkups and to have your teeth cleaned. · Wear a seat belt in the car. Follow your doctor's advice about when to have certain tests. These tests can spot problems early. · Cholesterol. Your doctor will tell you how often to have this done based on your age, family history, or other things that can increase your risk for heart attack and stroke. · Blood pressure.  Have your blood pressure checked during a routine doctor visit. Your doctor will tell you how often to check your blood pressure based on your age, your blood pressure results, and other factors. · Mammogram. Ask your doctor how often you should have a mammogram, which is an X-ray of your breasts. A mammogram can spot breast cancer before it can be felt and when it is easiest to treat. · Pap test and pelvic exam. Ask your doctor how often you should have a Pap test. You may not need to have a Pap test as often as you used to. · Vision. Have your eyes checked every year or two or as often as your doctor suggests. Some experts recommend that you have yearly exams for glaucoma and other age-related eye problems starting at age 48. · Hearing. Tell your doctor if you notice any change in your hearing. You can have tests to find out how well you hear. · Diabetes. Ask your doctor whether you should have tests for diabetes. · Colorectal cancer. Your risk for colorectal cancer gets higher as you get older. Some experts say that adults should start regular screening at age 48 and stop at age 76. Others say to start before age 48 or continue after age 76. Talk with your doctor about your risk and when to start and stop screening. · Thyroid disease. Talk to your doctor about whether to have your thyroid checked as part of a regular physical exam. Women have an increased chance of a thyroid problem. · Osteoporosis. You should begin tests for bone density at age 72. If you are younger than 72, ask your doctor whether you have factors that may increase your risk for this disease. You may want to have this test before age 72. · Heart attack and stroke risk. At least every 4 to 6 years, you should have your risk for heart attack and stroke assessed. Your doctor uses factors such as your age, blood pressure, cholesterol, and whether you smoke or have diabetes to show what your risk for a heart attack or stroke is over the next 10 years.   When should you call for help?  Watch closely for changes in your health, and be sure to contact your doctor if you have any problems or symptoms that concern you. Where can you learn more? Go to http://shanna-zoe.info/  Enter [id number] in the search box to learn more about \"Well Visit, Women 50 to 72: Care Instructions. \"  Current as of: August 22, 2019               Content Version: 12.5  © 4962-2749 [employer], [employer]. Care instructions adapted under license by [employer] (which disclaims liability or warranty for this information). If you have questions about a medical condition or this instruction, always ask your healthcare professional. [address] any warranty or liability for your use of this information.

## 2020-07-21 ENCOUNTER — OFFICE VISIT (OUTPATIENT)
Dept: OBGYN CLINIC | Age: 60
End: 2020-07-21

## 2020-07-21 VITALS
WEIGHT: 146 LBS | SYSTOLIC BLOOD PRESSURE: 108 MMHG | BODY MASS INDEX: 27.56 KG/M2 | DIASTOLIC BLOOD PRESSURE: 70 MMHG | HEIGHT: 61 IN

## 2020-07-21 DIAGNOSIS — Z01.419 ENCOUNTER FOR GYNECOLOGICAL EXAMINATION (GENERAL) (ROUTINE) WITHOUT ABNORMAL FINDINGS: Primary | ICD-10-CM

## 2020-07-21 DIAGNOSIS — N89.8 VAGINAL ODOR: ICD-10-CM

## 2020-07-21 DIAGNOSIS — B00.9 HSV INFECTION: ICD-10-CM

## 2020-07-21 LAB — WET MOUNT POCT, WMPOCT: NORMAL

## 2020-07-21 RX ORDER — VALACYCLOVIR HYDROCHLORIDE 1 G/1
TABLET, FILM COATED ORAL
Qty: 30 TAB | Refills: 1 | Status: SHIPPED | OUTPATIENT
Start: 2020-07-21 | End: 2021-07-23 | Stop reason: SDUPTHER

## 2020-07-21 RX ORDER — HYDROCHLOROTHIAZIDE 12.5 MG/1
12.5 TABLET ORAL DAILY
COMMUNITY
Start: 2020-04-24

## 2020-07-21 RX ORDER — THYROID,PORK 65 MG
65 TABLET ORAL DAILY
COMMUNITY
Start: 2020-06-23

## 2020-07-21 RX ORDER — LOSARTAN POTASSIUM 25 MG/1
25 TABLET ORAL DAILY
COMMUNITY
Start: 2020-07-08

## 2020-07-21 NOTE — PROGRESS NOTES
[address] [facility]  [email or web address]  [phone]    Chela Lutz MD, [address] [facility]       Annual Gynecologic Exam:  WWE s/p hyst  Chief Complaint   Patient presents with    Annual Exam       Hector Thibodeaux is a 60 y.o.  [place]   female who is here for an annual exam.  She co upper abd pain when lifting (mother had stroke)  No recent SA, lots of stress. Co vaginal odor on and off. She does not report additional concerns today. Menstrual status:  Her periods are absent with a history of a hysterectomy. Contraception:  The current method of family planning is a hysterectomy. Sexual history:  She does not reports new sexual partner(s) in the last year. Preventive Medicine History:. Her most recent Pap smear was normal obtained 2018  Her most recent HR HPV screen was Negative obtained [id number]   She reports she does not have a history of LANE 2, 3 or cervical cancer. Breast health: The patient had her mammogram today in our office. She does not have a first or second degree relative with a history of breast cancer. Bone health: Ashleigh Plaster She does not have a history of osteopenia/osteoporosis. Osteoporosis family history updated: see FH. She is not currently taking calcium and vitamin D.       Past Medical History:   Diagnosis Date    Abnormal mammogram of left breast 2018; 18    add'l views MMG birads 2, left    Abnormal ultrasound of breast 2018    Left, breast cyst an 8 mm x 4 mm x 6 mm    Abnormal uterine bleeding     H/O mammogram 2019    negative    History of mammogram 13; 5/29/15; 17    negative; Negative; negative    HSV (herpes simplex virus) infection     oral     Hypothyroidism     Pap smear for cervical cancer screening 4/20/10,5/29/15    negative, HPV negative; Neg HPV + #16    Pap smear for cervical cancer screening 2016; 17; 7/3/18    negative, HPV negative; neg, hpv neg; Neg, HPV Neg    PUD (peptic ulcer disease) 1979    stomach ulcer     Past Surgical History:   Procedure Laterality Date    HX COLPOSCOPY  6/25/15    Pathology benign with inflammation    HX GYN  10/2010    partial hysterectomy,bladder prolapse     HX HERNIA REPAIR  2003    ventral hernia repair    HX OTHER SURGICAL      liver biopsy, benign    HX OTHER SURGICAL      Laparoscopy, X 2, ovarian cyst    HX OTHER SURGICAL  9/30/11    Fatty Lymphoma Excision     Family History   Problem Relation Age of Onset    Hypertension Mother     Other Father         ETOH    Breast Cancer Sister         age 39, metastatic, negative BRCA testing    Other Other         lymphoma, uncle     Social History     Tobacco Use    Smoking status: Former Smoker    Smokeless tobacco: Never Used   Substance Use Topics    Alcohol use: Yes     Alcohol/week: 1.7 standard drinks     Types: 1 Glasses of wine, 1 Shots of liquor per week     Comment: social    Drug use: No     Allergies   Allergen Reactions    Aspirin Other (comments)     \"stomach pain\"    Erythromycin Rash     In childhood    Penicillins Rash     As a child    Tetracycline Nausea and Vomiting     Current Outpatient Medications   Medication Sig    Nature-Throid 65 mg tab Take 65 mg by mouth daily.  hydroCHLOROthiazide (HYDRODIURIL) 12.5 mg tablet Take 12.5 mg by mouth daily.  losartan (COZAAR) 25 mg tablet Take 25 mg by mouth daily.  valACYclovir (VALTREX) 1 gram tablet Take one tablet by mouth every 12 hours    LINACLOTIDE (LINZESS PO) Take  by mouth.  multivitamin, stress formula (STRESS TAB) tablet Take 1 Tab by mouth daily.  PV W-O BENITEZ/FERROUS FUMARATE/FA (M-VIT PO) Take 1 Tab by mouth daily.  ARMOUR THYROID 15 mg tablet     valsartan (DIOVAN) 80 mg tablet TAKE 1 TABLET BY MOUTH EVERY DAY    levothyroxine (SYNTHROID) 25 mcg tablet Take  by mouth Daily (before breakfast). No current facility-administered medications for this visit.         Tobacco History: reports that she has quit smoking. She has never used smokeless tobacco.  Alcohol Abuse:  reports current alcohol use of about 1.7 standard drinks of alcohol per week. Drug Abuse:  reports no history of drug use.     Patient Active Problem List   Diagnosis Code    Meniscus tear S83.209A    HSV infection B00.9       Review of Systems - History obtained from the patient  Constitutional: negative for weight loss, fever, night sweats  HEENT: negative for hearing loss, earache, congestion, snoring, sorethroat  CV: negative for chest pain, palpitations, edema  Resp: negative for cough, shortness of breath, wheezing  GI: see hpi  : negative for frequency, dysuria, hematuria, vaginal discharge  MSK: negative for back pain, joint pain, muscle pain  Breast: negative for breast lumps, nipple discharge, galactorrhea  Skin :negative for itching, rash, hives  Neuro: negative for dizziness, headache, confusion, weakness  Psych: negative for anxiety, depression, change in mood  Heme/lymph: negative for bleeding, bruising, pallor    (WWE continued)      Physical Exam  Visit Vitals  /70 (BP 1 Location: Left arm, BP Patient Position: Sitting)   Ht 5' 1\" (1.549 m)   Wt 146 lb (66.2 kg)   BMI 27.59 kg/m²       Constitutional  · Appearance: well-nourished, well developed, alert, in no acute distress    HENT  · Head and Face: appears normal    Neck  · Inspection/Palpation: normal appearance, no masses or tenderness  · Lymph Nodes: no lymphadenopathy present  · Thyroid: gland size normal, nontender, no nodules or masses present on palpation    Chest  · Respiratory Effort: breathing unlabored  · Auscultation: normal breath sounds    Cardiovascular  · Heart:  · Auscultation: regular rate and rhythm without murmur    Breasts  · Inspection of Breasts: breasts symmetrical, no skin changes, no discharge present, nipple appearance normal, no skin retraction present  · Palpation of Breasts and Axillae: no masses present on palpation, no breast tenderness  · Axillary Lymph Nodes: no lymphadenopathy present    Gastrointestinal  · Abdominal Examination: abdomen non-tender to palpation, normal bowel sounds, no masses present, no palpable hernia with valsalva  · Liver and spleen: no hepatomegaly present, spleen not palpable  · Hernias: no hernias identified    Genitourinary  · External Genitalia: normal appearance for age, no discharge present, no tenderness present, no inflammatory lesions present, no masses present, no atrophy present  · Vagina: normal vaginal vault without central or paravaginal defects, thin white discharge present, no inflammatory lesions present, no masses present  · Bladder: non-tender to palpation  · Urethra: appears normal  · Uterus: absent  · Adnexa: no adnexal tenderness present, no adnexal masses present  · Perineum: perineum within normal limits, no evidence of trauma, no rashes or skin lesions present  · Anus: anus within normal limits, no hemorrhoids present  · Inguinal Lymph Nodes: no lymphadenopathy present    Skin  · General Inspection: no rash, no lesions identified    Neurologic/Psychiatric  · Mental Status:  · Orientation: grossly oriented to person, place and time  · Mood and Affect: mood normal, affect appropriate    Assessment:  Encounter Diagnoses   Name Primary?  Encounter for gynecological examination (general) (routine) without abnormal findings Yes    HSV infection     Vaginal odor    upper abd pain    Plan:  The patient was counseled about diet, exercise, healthy lifestyle  We recommended the patient return for yearly wellness visits  We discussed self breast exam  We discussed safer sex practices, condom use and risk factors for sexually transmitted diseases. We discussed current pap smear and HR HPV testing guidelines. Handouts were given to the patient.    She is instructed to contact our office with any questions or concerns  We recommend repeating her annual exam in 1 year or following up sooner if needed  Handouts were given to the patient  We recommend follow up with a primary care physician for chronic medical problems and evaluation of non-gynecologic concerns. Valtrex refill prn  Disc work/life balance and self care  We discussed potential causes of vaginal discharge/irritation. We discussed good vulvar hygiene. Recommended avoid vaginal irritants. Discussed use of mild soaps/detergents. Follow up if NI. We reviewed wet prep findings with the patient at her visit. Patient will be notified about swab results and prescription sent, if indicated. rec surgery fu for abd pain s/p mesh/hernia repair  We discussed potential causes of lower abdominal/pelvic pain: GYN, GI, , musculoskeletal, infectious process, adhesions, or other etiology  We discussed evaluation of lower abdominal/pelvic pain: including but not limited to observation, surgical evaluation/laparoscopy, imaging   We discussed treatment of lower abdominal/pelvic pain: including but not limited to: pain medication, hormonal management, surgical intervention, bowel regimen. Since pain can be a symptoms of an underlying abnormal process she is encouraged to contact my office with persistent symptoms for additional evaluation and treatment if needed.           Orders Placed This Encounter    NUSWAB VAGINITIS ([facility])    AMB POC WET PREP (AKA STAIN, INTERPRET, WET MOUNT)    valACYclovir (VALTREX) 1 gram tablet       Results for orders placed or performed in visit on 07/21/20   AMB POC SMEAR, STAIN & INTERPRET, WET MOUNT   Result Value Ref Range    Wet mount (POC)      Narrative    ONDINA    Hypae: negative  Buds: negative    Wet Prep:  Trich: negative  Clue cells: negative  Hyphae: negative  Buds: negative  WBC's: normal     Results for orders placed or performed in visit on 07/21/20   CHACORTA 3D KATHY W MAMMO BI SCREENING INCL CAD    Narrative    STUDY: Bilateral digital screening mammogram with 3-D tomosynthesis    INDICATION: Screening. COMPARISON: 2019-2013, 2011, 2010    BREAST COMPOSITION: There are scattered areas of fibroglandular density. FINDINGS: Bilateral digital screening mammography was performed and is  interpreted in conjunction with a computer assisted detection (CAD) system. Additionally, tomosynthesis of both breasts in the CC and MLO projections was  performed. No suspicious masses or calcifications are identified. There has been  no significant change. Impression    IMPRESSION:  BI-RADS 1: Negative. No mammographic evidence of malignancy. RECOMMENDATIONS:  Next screening mammogram is recommended in one year. The patient will be notified of these results.

## 2020-07-21 NOTE — LETTER
NOTIFICATION RETURN TO WORK / SCHOOL 
 
7/21/2020 10:38 AM 
 
Ms. Toy Rivero [address] [facility] [address] To Whom It May Concern: 
 
Toy Rivero is currently under the care of [address] [place] [address]. She completed a screening mammogram in our office today. If there are questions or concerns please have the patient contact our office. Sincerely, Ora Guerrero MD

## 2020-07-28 LAB
A VAGINAE DNA VAG QL NAA+PROBE: NORMAL SCORE
BVAB2 DNA VAG QL NAA+PROBE: NORMAL SCORE
C ALBICANS DNA VAG QL NAA+PROBE: NEGATIVE
C GLABRATA DNA VAG QL NAA+PROBE: NEGATIVE
MEGA1 DNA VAG QL NAA+PROBE: NORMAL SCORE
T VAGINALIS DNA VAG QL NAA+PROBE: NEGATIVE

## 2021-06-16 ENCOUNTER — APPOINTMENT (RX ONLY)
Dept: URBAN - NONMETROPOLITAN AREA CLINIC 1 | Facility: CLINIC | Age: 61
Setting detail: DERMATOLOGY
End: 2021-06-16

## 2021-06-16 DIAGNOSIS — L57.8 OTHER SKIN CHANGES DUE TO CHRONIC EXPOSURE TO NONIONIZING RADIATION: ICD-10-CM | Status: STABLE

## 2021-06-16 DIAGNOSIS — L82.1 OTHER SEBORRHEIC KERATOSIS: ICD-10-CM

## 2021-06-16 DIAGNOSIS — D22 MELANOCYTIC NEVI: ICD-10-CM

## 2021-06-16 DIAGNOSIS — L81.4 OTHER MELANIN HYPERPIGMENTATION: ICD-10-CM

## 2021-06-16 DIAGNOSIS — D18.0 HEMANGIOMA: ICD-10-CM

## 2021-06-16 DIAGNOSIS — L82.0 INFLAMED SEBORRHEIC KERATOSIS: ICD-10-CM

## 2021-06-16 PROBLEM — D22.5 MELANOCYTIC NEVI OF TRUNK: Status: ACTIVE | Noted: 2021-06-16

## 2021-06-16 PROBLEM — D18.01 HEMANGIOMA OF SKIN AND SUBCUTANEOUS TISSUE: Status: ACTIVE | Noted: 2021-06-16

## 2021-06-16 PROBLEM — D48.5 NEOPLASM OF UNCERTAIN BEHAVIOR OF SKIN: Status: ACTIVE | Noted: 2021-06-16

## 2021-06-16 PROCEDURE — ? BIOPSY BY SHAVE METHOD

## 2021-06-16 PROCEDURE — 17110 DESTRUCTION B9 LES UP TO 14: CPT

## 2021-06-16 PROCEDURE — ? LIQUID NITROGEN

## 2021-06-16 PROCEDURE — ? SUNSCREEN RECOMMENDATIONS

## 2021-06-16 PROCEDURE — ? COUNSELING

## 2021-06-16 PROCEDURE — 11102 TANGNTL BX SKIN SINGLE LES: CPT | Mod: 59

## 2021-06-16 PROCEDURE — 99203 OFFICE O/P NEW LOW 30 MIN: CPT | Mod: 25

## 2021-06-16 PROCEDURE — ? FULL BODY SKIN EXAM

## 2021-06-16 PROCEDURE — ? TREATMENT REGIMEN

## 2021-06-16 PROCEDURE — ? ADDITIONAL NOTES

## 2021-06-16 ASSESSMENT — LOCATION DETAILED DESCRIPTION DERM
LOCATION DETAILED: LEFT INFERIOR MEDIAL UPPER BACK
LOCATION DETAILED: LEFT INFERIOR LATERAL NECK
LOCATION DETAILED: RIGHT MEDIAL SUPERIOR CHEST
LOCATION DETAILED: LEFT INFERIOR MEDIAL MIDBACK
LOCATION DETAILED: LEFT PROXIMAL DORSAL FOREARM
LOCATION DETAILED: RIGHT SUPERIOR MEDIAL FOREHEAD
LOCATION DETAILED: LEFT POPLITEAL SKIN
LOCATION DETAILED: RIGHT INFERIOR LATERAL MIDBACK
LOCATION DETAILED: RIGHT PROXIMAL RADIAL DORSAL FOREARM
LOCATION DETAILED: LEFT MID-UPPER BACK
LOCATION DETAILED: LEFT SUPERIOR MEDIAL MIDBACK

## 2021-06-16 ASSESSMENT — LOCATION SIMPLE DESCRIPTION DERM
LOCATION SIMPLE: CHEST
LOCATION SIMPLE: RIGHT FOREHEAD
LOCATION SIMPLE: LEFT UPPER BACK
LOCATION SIMPLE: RIGHT FOREARM
LOCATION SIMPLE: LEFT ANTERIOR NECK
LOCATION SIMPLE: RIGHT LOWER BACK
LOCATION SIMPLE: LEFT FOREARM
LOCATION SIMPLE: LEFT LOWER BACK
LOCATION SIMPLE: LEFT POPLITEAL SKIN

## 2021-06-16 ASSESSMENT — LOCATION ZONE DERM
LOCATION ZONE: TRUNK
LOCATION ZONE: FACE
LOCATION ZONE: NECK
LOCATION ZONE: ARM
LOCATION ZONE: LEG

## 2021-07-22 ENCOUNTER — APPOINTMENT (RX ONLY)
Dept: URBAN - NONMETROPOLITAN AREA CLINIC 1 | Facility: CLINIC | Age: 61
Setting detail: DERMATOLOGY
End: 2021-07-22

## 2021-07-22 DIAGNOSIS — L68.9 HYPERTRICHOSIS, UNSPECIFIED: ICD-10-CM

## 2021-07-22 DIAGNOSIS — L82.0 INFLAMED SEBORRHEIC KERATOSIS: ICD-10-CM | Status: IMPROVED

## 2021-07-22 PROCEDURE — ? COUNSELING

## 2021-07-22 PROCEDURE — 99213 OFFICE O/P EST LOW 20 MIN: CPT

## 2021-07-22 PROCEDURE — ? TREATMENT REGIMEN

## 2021-07-22 PROCEDURE — ? FULL BODY SKIN EXAM - DECLINED

## 2021-07-22 PROCEDURE — ? ADDITIONAL NOTES

## 2021-07-22 ASSESSMENT — LOCATION DETAILED DESCRIPTION DERM
LOCATION DETAILED: LEFT UPPER CUTANEOUS LIP
LOCATION DETAILED: RIGHT UPPER CUTANEOUS LIP
LOCATION DETAILED: RIGHT MEDIAL UPPER BACK

## 2021-07-22 ASSESSMENT — LOCATION ZONE DERM
LOCATION ZONE: TRUNK
LOCATION ZONE: LIP

## 2021-07-22 ASSESSMENT — LOCATION SIMPLE DESCRIPTION DERM
LOCATION SIMPLE: RIGHT LIP
LOCATION SIMPLE: LEFT LIP
LOCATION SIMPLE: RIGHT UPPER BACK

## 2021-07-22 NOTE — PROCEDURE: TREATMENT REGIMEN
Plan: Discussed with the patient to RTO around September once it cools off and patient no longer will be using the pool for additions LN2 treatments

## 2021-07-23 NOTE — PATIENT INSTRUCTIONS
Well Visit, Ages 25 to 48: Care Instructions  Overview     Well visits can help you stay healthy. Your doctor has checked your overall health and may have suggested ways to take good care of yourself. Your doctor also may have recommended tests. At home, you can help prevent illness with healthy eating, regular exercise, and other steps. Follow-up care is a key part of your treatment and safety. Be sure to make and go to all appointments, and call your doctor if you are having problems. It's also a good idea to know your test results and keep a list of the medicines you take. How can you care for yourself at home? · Get screening tests that you and your doctor decide on. Screening helps find diseases before any symptoms appear. · Eat healthy foods. Choose fruits, vegetables, whole grains, protein, and low-fat dairy foods. Limit fat, especially saturated fat. Reduce salt in your diet. · Limit alcohol. If you are a man, have no more than 2 drinks a day or 14 drinks a week. If you are a woman, have no more than 1 drink a day or 7 drinks a week. · Get at least 30 minutes of physical activity on most days of the week. Walking is a good choice. You also may want to do other activities, such as running, swimming, cycling, or playing tennis or team sports. Discuss any changes in your exercise program with your doctor. · Reach and stay at a healthy weight. This will lower your risk for many problems, such as obesity, diabetes, heart disease, and high blood pressure. · Do not smoke or allow others to smoke around you. If you need help quitting, talk to your doctor about stop-smoking programs and medicines. These can increase your chances of quitting for good. · Care for your mental health. It is easy to get weighed down by worry and stress. Learn strategies to manage stress, like deep breathing and mindfulness, and stay connected with your family and community.  If you find you often feel sad or hopeless, talk with your doctor. Treatment can help. · Talk to your doctor about whether you have any risk factors for sexually transmitted infections (STIs). You can help prevent STIs if you wait to have sex with a new partner (or partners) until you've each been tested for STIs. It also helps if you use condoms (male or female condoms) and if you limit your sex partners to one person who only has sex with you. Vaccines are available for some STIs, such as HPV. · Use birth control if it's important to you to prevent pregnancy. Talk with your doctor about the choices available and what might be best for you. · If you think you may have a problem with alcohol or drug use, talk to your doctor. This includes prescription medicines (such as amphetamines and opioids) and illegal drugs (such as cocaine and methamphetamine). Your doctor can help you figure out what type of treatment is best for you. · Protect your skin from too much sun. When you're outdoors from 10 a.m. to 4 p.m., stay in the shade or cover up with clothing and a hat with a wide brim. Wear sunglasses that block UV rays. Even when it's cloudy, put broad-spectrum sunscreen (SPF 30 or higher) on any exposed skin. · See a dentist one or two times a year for checkups and to have your teeth cleaned. · Wear a seat belt in the car. When should you call for help? Watch closely for changes in your health, and be sure to contact your doctor if you have any problems or symptoms that concern you. Where can you learn more? Go to [email or web address]/  Enter P072 in the search box to learn more about \"Well Visit, Ages 25 to 48: Care Instructions. \"  Current as of: May 27, 2020               Content Version: 12.8  © 4738-0960 Healthwise, Incorporated. Care instructions adapted under license by [employer] (which disclaims liability or warranty for this information).  If you have questions about a medical condition or this instruction, always ask your healthcare professional. [place] [address] any warranty or liability for your use of this information.

## 2021-07-27 ENCOUNTER — OFFICE VISIT (OUTPATIENT)
Dept: OBGYN CLINIC | Age: 61
End: 2021-07-27

## 2021-07-27 VITALS
DIASTOLIC BLOOD PRESSURE: 79 MMHG | WEIGHT: 143.2 LBS | HEART RATE: 54 BPM | SYSTOLIC BLOOD PRESSURE: 136 MMHG | BODY MASS INDEX: 27.03 KG/M2 | HEIGHT: 61 IN

## 2021-07-27 DIAGNOSIS — Z12.4 ENCOUNTER FOR PAPANICOLAOU SMEAR FOR CERVICAL CANCER SCREENING: Primary | ICD-10-CM

## 2021-07-27 DIAGNOSIS — B00.9 HSV INFECTION: ICD-10-CM

## 2021-07-27 DIAGNOSIS — Z01.419 ENCOUNTER FOR WELL WOMAN EXAM WITH ROUTINE GYNECOLOGICAL EXAM: ICD-10-CM

## 2021-07-27 PROCEDURE — 99396 PREV VISIT EST AGE 40-64: CPT | Performed by: OBSTETRICS & GYNECOLOGY

## 2021-07-27 RX ORDER — VALACYCLOVIR HYDROCHLORIDE 1 G/1
TABLET, FILM COATED ORAL
Qty: 30 TABLET | Refills: 1 | Status: SHIPPED | OUTPATIENT
Start: 2021-07-27

## 2021-07-27 NOTE — PROGRESS NOTES
[address] [facility]  [email or web address]  [phone]    Reyes Lor, MD, [address] [facility] [address]       Annual Gynecologic Exam:  [facility] s/p hyst  Chief Complaint   Patient presents with    Well Woman    Jaida Lopez is a 63 y.o.  WHITE/NON-  female who is here for an annual exam.    She reports the following additional concerns: Pt would like her prescription renewed, not refilled. Doing well. (oral hsv)    Menstrual status:  Her periods are absent with a history of a hysterectomy. Contraception:  The current method of family planning is a hysterectomy. Preventive Medicine History:. Her most recent Pap smear was normal obtained   Her most recent HR HPV screen was Negative obtained [id number]   She reports she does not have a history of LANE 2, 3 or cervical cancer. Breast health: The patient had her mammogram today in our office.     Past Medical History:   Diagnosis Date    Abnormal mammogram of left breast 2018; 18    add'l views MMG birads 2, left    Abnormal ultrasound of breast 2018    Left, breast cyst an 8 mm x 4 mm x 6 mm    Abnormal uterine bleeding     H/O mammogram 2019; 2020    negative;  Negative BiRads-1, fibroglandular density    History of mammogram 13; 5/29/15; 17    negative; Negative; negative    HSV (herpes simplex virus) infection     oral     Hypothyroidism     Pap smear for cervical cancer screening 4/20/10,5/29/15    negative, HPV negative; Neg HPV + #16    Pap smear for cervical cancer screening 2016; 17; 7/3/18    negative, HPV negative; neg, hpv neg; Neg, HPV Neg    PUD (peptic ulcer disease)     stomach ulcer     Past Surgical History:   Procedure Laterality Date    HX COLPOSCOPY  6/25/15    Pathology benign with inflammation    HX GYN  10/2010    partial hysterectomy,bladder prolapse     HX HERNIA REPAIR      ventral hernia repair    HX OTHER SURGICAL      liver biopsy, benign    HX OTHER SURGICAL      Laparoscopy, X 2, ovarian cyst    HX OTHER SURGICAL  9/30/11    Fatty Lymphoma Excision     Family History   Problem Relation Age of Onset    Hypertension Mother     Other Father         ETOH    Breast Cancer Sister         age 39, metastatic, negative BRCA testing    Other Other         lymphoma, uncle     Social History     Tobacco Use    Smoking status: Former Smoker    Smokeless tobacco: Never Used   Vaping Use    Vaping Use: Never used   Substance Use Topics    Alcohol use: Yes     Alcohol/week: 2.0 - 3.0 standard drinks     Types: 2 - 3 Glasses of wine per week     Comment: social    Drug use: No     Allergies   Allergen Reactions    Aspirin Other (comments)     \"stomach pain\"    Erythromycin Rash     In childhood    Penicillins Rash     As a child    Tetracycline Nausea and Vomiting     Current Outpatient Medications   Medication Sig    Nature-Throid 65 mg tab Take 65 mg by mouth daily.  hydroCHLOROthiazide (HYDRODIURIL) 12.5 mg tablet Take 12.5 mg by mouth daily.  losartan (COZAAR) 25 mg tablet Take 25 mg by mouth daily.  valACYclovir (VALTREX) 1 gram tablet Take one tablet by mouth every 12 hours    LINACLOTIDE (LINZESS PO) Take  by mouth.  PV W-O BENITEZ/FERROUS FUMARATE/FA (M-VIT PO) Take 1 Tab by mouth daily.  ARMOUR THYROID 15 mg tablet  (Patient not taking: Reported on 7/27/2021)    valsartan (DIOVAN) 80 mg tablet TAKE 1 TABLET BY MOUTH EVERY DAY (Patient not taking: Reported on 7/27/2021)    levothyroxine (SYNTHROID) 25 mcg tablet Take  by mouth Daily (before breakfast). (Patient not taking: Reported on 7/27/2021)    multivitamin, stress formula (STRESS TAB) tablet Take 1 Tab by mouth daily. (Patient not taking: Reported on 7/27/2021)     No current facility-administered medications for this visit. Tobacco History:  reports that she has quit smoking.  She has never used smokeless tobacco.  Alcohol Abuse:  reports current alcohol use of about 2.0 - 3.0 standard drinks of alcohol per week. Drug Abuse:  reports no history of drug use.     Patient Active Problem List   Diagnosis Code    Meniscus tear S83.209A    HSV infection B00.9       Review of Systems - History obtained from the patient and patient filled out questionnaire   Constitutional/general, HEENT, CV, Resp, GI, MSK, Neuro, Psych, Heme/lymph, Skin, Breast ROS: no significant complaints except as noted on HPI    Physical Exam  Visit Vitals  /79 (BP 1 Location: Right upper arm, BP Patient Position: Sitting, BP Cuff Size: Adult)   Pulse (!) 54   Ht 5' 1\" (1.549 m)   Wt 143 lb 3.2 oz (65 kg)   BMI 27.06 kg/m²       Constitutional  · Appearance: well-nourished, well developed, alert, in no acute distress    HENT  · Head and Face: appears normal    Neck  · Inspection/Palpation: normal appearance, no masses or tenderness  · Lymph Nodes: no lymphadenopathy present  · Thyroid: gland size normal, nontender, no nodules or masses present on palpation    Chest  · Respiratory Effort: breathing unlabored  · Auscultation: normal breath sounds    Cardiovascular  · Heart:  · Auscultation: regular rate and rhythm without murmur    Breasts  · Inspection of Breasts: breasts symmetrical, no skin changes, no discharge present, nipple appearance normal, no skin retraction present  · Palpation of Breasts and Axillae: no masses present on palpation, no breast tenderness  · Axillary Lymph Nodes: no lymphadenopathy present    Gastrointestinal  · Abdominal Examination: abdomen non-tender to palpation, normal bowel sounds, no masses present  · Liver and spleen: no hepatomegaly present, spleen not palpable  · Hernias: no hernias identified    Genitourinary  · External Genitalia: normal appearance for age, no discharge present, no tenderness present, no inflammatory lesions present, no masses present, minimal atrophy present  · Vagina: normal vaginal vault without central or paravaginal defects, no discharge present, no inflammatory lesions present, no masses present  · Bladder: non-tender to palpation  · Urethra: appears normal  · Cervix: present   · Uterus: absent  · Adnexa: no adnexal tenderness present, no adnexal masses present  · Perineum: perineum within normal limits, no evidence of trauma, no rashes or skin lesions present  · Anus: anus within normal limits, no hemorrhoids present  · Inguinal Lymph Nodes: no lymphadenopathy present    Skin  · General Inspection: no rash, no lesions identified    Neurologic/Psychiatric  · Mental Status:  · Orientation: grossly oriented to person, place and time  · Mood and Affect: mood normal, affect appropriate    Assessment:  63 y.o. for well woman exam  History of hysterectomy  Encounter Diagnoses   Name Primary?  HSV infection     Encounter for Papanicolaou smear for cervical cancer screening Yes    Encounter for well woman exam with routine gynecological exam        Plan:  The patient was counseled about healthy lifestyle, disease prevention, signs and symptoms of breast cancer and bone protection. Handouts were given to the patient. She is instructed to contact our office with any questions or concerns  I recommend following up for her annual well woman exam in 1 year or following up sooner if needed  I recommend follow up with a primary care physician for chronic medical problems and evaluation of non-gynecologic concerns and to please contact our office with any GYN questions or concerns. No orders of the defined types were placed in this encounter. Results for orders placed or performed during the hospital encounter of 07/27/21   [facility] MAMMO BI SCREENING INCL CAD    Narrative    STUDY: Bilateral digital screening mammogram    INDICATION:  Screening. COMPARISON: Prior studies dating back to 2012    BREAST COMPOSITION:  The breasts are heterogeneously dense, which may obscure  small masses.     FINDINGS: Bilateral digital screening mammography was performed and is  interpreted in conjunction with a computer assisted detection (CAD) system. No  suspicious masses or calcifications are identified. There has been no  significant change. Impression    BI-RADS 1: Negative. No mammographic evidence of malignancy. RECOMMENDATIONS:  Next screening mammogram is recommended in one year. The patient will be notified of these results.

## 2021-07-29 LAB
CYTOLOGIST CVX/VAG CYTO: NORMAL
CYTOLOGY CVX/VAG DOC CYTO: NORMAL
CYTOLOGY CVX/VAG DOC THIN PREP: NORMAL
CYTOLOGY HISTORY:: NORMAL
DX ICD CODE: NORMAL
HPV I/H RISK 4 DNA CVX QL PROBE+SIG AMP: NEGATIVE
Lab: NORMAL
OTHER STN SPEC: NORMAL
STAT OF ADQ CVX/VAG CYTO-IMP: NORMAL

## 2021-07-30 ENCOUNTER — TELEPHONE (OUTPATIENT)
Dept: OBGYN CLINIC | Age: 61
End: 2021-07-30

## 2021-08-03 RX ORDER — METRONIDAZOLE 500 MG/1
500 TABLET ORAL 2 TIMES DAILY
Qty: 14 TABLET | Refills: 0 | Status: SHIPPED | OUTPATIENT
Start: 2021-08-03 | End: 2021-08-10

## 2021-08-03 NOTE — PROGRESS NOTES
Normal pap smear, message sent if [address] active. Update PMH/HM: include: Date of pap, Cytology: wnl. For HR HPV results: list NEG or POS, when done.   If having sx: mc message sent: can send flagyl 500mg bid x7d

## 2021-08-06 NOTE — PROGRESS NOTES
Updated [facility]    Follow up call to Ms. Chacorta Lemos Pt verified self and birth date for privacy precautions. Patient was advised of normal results, she is having odor & knows the rx is at her pharmacy. Ms. Ariadne Sánchez acknowledged understanding and all questions were answered to patients satisfaction. No further questions or concerns at this time.

## 2022-03-19 PROBLEM — B00.9 HSV INFECTION: Status: ACTIVE | Noted: 2018-07-03

## 2022-03-30 ENCOUNTER — APPOINTMENT (RX ONLY)
Dept: URBAN - NONMETROPOLITAN AREA CLINIC 1 | Facility: CLINIC | Age: 62
Setting detail: DERMATOLOGY
End: 2022-03-30

## 2022-03-30 DIAGNOSIS — L0390 CELLULITIS AND ABSCESS OF UNSPECIFIED SITES: ICD-10-CM | Status: INADEQUATELY CONTROLLED

## 2022-03-30 DIAGNOSIS — L0391 CELLULITIS AND ABSCESS OF UNSPECIFIED SITES: ICD-10-CM | Status: INADEQUATELY CONTROLLED

## 2022-03-30 PROBLEM — H60.01 ABSCESS OF RIGHT EXTERNAL EAR: Status: ACTIVE | Noted: 2022-03-30

## 2022-03-30 PROCEDURE — ? ADDITIONAL NOTES

## 2022-03-30 PROCEDURE — ? COUNSELING

## 2022-03-30 PROCEDURE — ? PRESCRIPTION MEDICATION MANAGEMENT

## 2022-03-30 PROCEDURE — ? FULL BODY SKIN EXAM - DECLINED

## 2022-03-30 PROCEDURE — ? MEDICATION COUNSELING

## 2022-03-30 PROCEDURE — ? PRESCRIPTION

## 2022-03-30 PROCEDURE — 99213 OFFICE O/P EST LOW 20 MIN: CPT

## 2022-03-30 RX ORDER — SULFAMETHOXAZOLE AND TRIMETHOPRIM 800; 160 MG/1; MG/1
TABLET ORAL
Qty: 20 | Refills: 0 | Status: ERX | COMMUNITY
Start: 2022-03-30

## 2022-03-30 RX ORDER — MUPIROCIN 20 MG/G
OINTMENT TOPICAL
Qty: 1 | Refills: 0 | Status: ERX | COMMUNITY
Start: 2022-03-30

## 2022-03-30 RX ADMIN — MUPIROCIN: 20 OINTMENT TOPICAL at 00:00

## 2022-03-30 RX ADMIN — SULFAMETHOXAZOLE AND TRIMETHOPRIM: 800; 160 TABLET ORAL at 00:00

## 2022-03-30 ASSESSMENT — LOCATION DETAILED DESCRIPTION DERM: LOCATION DETAILED: RIGHT ANTERIOR EARLOBE

## 2022-03-30 ASSESSMENT — LOCATION ZONE DERM: LOCATION ZONE: EAR

## 2022-03-30 ASSESSMENT — LOCATION SIMPLE DESCRIPTION DERM: LOCATION SIMPLE: RIGHT EAR

## 2022-03-30 NOTE — PROCEDURE: PRESCRIPTION MEDICATION MANAGEMENT
Initiate Treatment: Bactrim bid x10 days \\nMupirocin bid x10 days
Render In Strict Bullet Format?: No
Detail Level: Zone

## 2022-03-30 NOTE — PROCEDURE: MEDICATION COUNSELING
Dutasteride Pregnancy And Lactation Text: This medication is absolutely contraindicated in women, especially during pregnancy and breast feeding. Feminization of male fetuses is possible if taking while pregnant. Dental caries

## 2022-03-30 NOTE — PROCEDURE: MEDICATION COUNSELING
Xelyoselynz Pregnancy And Lactation Text: This medication is Pregnancy Category D and is not considered safe during pregnancy.  The risk during breast feeding is also uncertain.

## 2022-03-30 NOTE — HPI: SKIN LESION
What Type Of Note Output Would You Prefer (Optional)?: Bullet Format
How Severe Is Your Skin Lesion?: mild
Has Your Skin Lesion Been Treated?: not been treated
Is This A New Presentation, Or A Follow-Up?: Growth
Additional History: Patient has a bump under the skin on her Rt ear that came to a head and drained watery blood with a tent of orange. Woke up with crust on ear. Applying warm compresses and wiping it off with alcohol pads. Photo in chart.

## 2022-04-25 ENCOUNTER — APPOINTMENT (RX ONLY)
Dept: URBAN - NONMETROPOLITAN AREA CLINIC 1 | Facility: CLINIC | Age: 62
Setting detail: DERMATOLOGY
End: 2022-04-25

## 2022-04-25 DIAGNOSIS — L57.8 OTHER SKIN CHANGES DUE TO CHRONIC EXPOSURE TO NONIONIZING RADIATION: ICD-10-CM | Status: STABLE

## 2022-04-25 DIAGNOSIS — L72.8 OTHER FOLLICULAR CYSTS OF THE SKIN AND SUBCUTANEOUS TISSUE: ICD-10-CM

## 2022-04-25 PROCEDURE — ? OBSERVATION AND MEASURE

## 2022-04-25 PROCEDURE — 99213 OFFICE O/P EST LOW 20 MIN: CPT

## 2022-04-25 PROCEDURE — ? TREATMENT REGIMEN

## 2022-04-25 PROCEDURE — ? COUNSELING

## 2022-04-25 PROCEDURE — ? FULL BODY SKIN EXAM - DECLINED

## 2022-04-25 ASSESSMENT — LOCATION ZONE DERM
LOCATION ZONE: EAR
LOCATION ZONE: EAR

## 2022-04-25 ASSESSMENT — LOCATION DETAILED DESCRIPTION DERM
LOCATION DETAILED: RIGHT ANTERIOR EARLOBE
LOCATION DETAILED: RIGHT ANTERIOR EARLOBE

## 2022-04-25 ASSESSMENT — LOCATION SIMPLE DESCRIPTION DERM
LOCATION SIMPLE: RIGHT EAR
LOCATION SIMPLE: RIGHT EAR

## 2022-05-09 ENCOUNTER — APPOINTMENT (RX ONLY)
Dept: URBAN - NONMETROPOLITAN AREA CLINIC 1 | Facility: CLINIC | Age: 62
Setting detail: DERMATOLOGY
End: 2022-05-09

## 2022-05-09 DIAGNOSIS — L72.8 OTHER FOLLICULAR CYSTS OF THE SKIN AND SUBCUTANEOUS TISSUE: ICD-10-CM

## 2022-05-09 PROBLEM — D48.5 NEOPLASM OF UNCERTAIN BEHAVIOR OF SKIN: Status: ACTIVE | Noted: 2022-05-09

## 2022-05-09 PROCEDURE — 69100 BIOPSY OF EXTERNAL EAR: CPT

## 2022-05-09 PROCEDURE — ? FULL BODY SKIN EXAM - DECLINED

## 2022-05-09 PROCEDURE — ? BIOPSY BY PUNCH METHOD

## 2022-05-09 ASSESSMENT — LOCATION DETAILED DESCRIPTION DERM: LOCATION DETAILED: RIGHT POSTERIOR EAR

## 2022-05-09 ASSESSMENT — LOCATION ZONE DERM: LOCATION ZONE: EAR

## 2022-05-09 ASSESSMENT — LOCATION SIMPLE DESCRIPTION DERM: LOCATION SIMPLE: RIGHT EAR

## 2022-05-09 NOTE — PROCEDURE: BIOPSY BY PUNCH METHOD
Detail Level: Detailed
Was A Bandage Applied: Yes
Punch Size In Mm: 5
Biopsy Type: H and E
Anesthesia Type: 1% lidocaine with epinephrine and a 1:10 solution of 8.4% sodium bicarbonate
Anesthesia Volume In Cc (Will Not Render If 0): 0.5
Additional Anesthesia Volume In Cc (Will Not Render If 0): 0
Hemostasis: None
Epidermal Sutures: 5-0 Prolene
Wound Care: Petrolatum
Dressing: bandage
Suture Removal: 7 days
Patient Will Remove Sutures At Home?: No
Lab: -97
Lab Facility: [id number]
Consent: Written consent was obtained and risks were reviewed including but not limited to scarring, infection, bleeding, scabbing, incomplete removal, nerve damage and allergy to anesthesia.
Post-Care Instructions: Detailed post-care instructions were reviewed with the patient. Patient is to keep the biopsy site dry overnight, and then apply healing ointment twice daily until healed. Patient may apply hydrogen peroxide soaks to remove any crusting.
Home Suture Removal Text: Patient was provided a home suture removal kit and will remove their sutures at home.  If they have any questions or difficulties they will call the office.
Notification Instructions: Patient will be notified of biopsy results. However, patient instructed to call the office if not contacted within 2 weeks.
Billing Type: Third-Party Bill
Information: Selecting Yes will display possible errors in your note based on the variables you have selected. This validation is only offered as a suggestion for you. PLEASE NOTE THAT THE VALIDATION TEXT WILL BE REMOVED WHEN YOU FINALIZE YOUR NOTE. IF YOU WANT TO FAX A PRELIMINARY NOTE YOU WILL NEED TO TOGGLE THIS TO 'NO' IF YOU DO NOT WANT IT IN YOUR FAXED NOTE.

## 2022-05-16 ENCOUNTER — APPOINTMENT (RX ONLY)
Dept: URBAN - NONMETROPOLITAN AREA CLINIC 1 | Facility: CLINIC | Age: 62
Setting detail: DERMATOLOGY
End: 2022-05-16

## 2022-05-16 DIAGNOSIS — Z48.02 ENCOUNTER FOR REMOVAL OF SUTURES: ICD-10-CM | Status: IMPROVED

## 2022-05-16 PROCEDURE — ? SUTURE REMOVAL (GLOBAL PERIOD)

## 2022-05-16 PROCEDURE — 99024 POSTOP FOLLOW-UP VISIT: CPT

## 2022-05-16 ASSESSMENT — LOCATION SIMPLE DESCRIPTION DERM: LOCATION SIMPLE: RIGHT EAR

## 2022-05-16 ASSESSMENT — LOCATION ZONE DERM: LOCATION ZONE: EAR

## 2022-05-16 ASSESSMENT — LOCATION DETAILED DESCRIPTION DERM: LOCATION DETAILED: RIGHT POSTERIOR EARLOBE

## 2022-05-16 NOTE — PROCEDURE: SUTURE REMOVAL (GLOBAL PERIOD)
Detail Level: Detailed
Add 17718 Cpt? (Important Note: In 2017 The Use Of 63164 Is Being Tracked By Cms To Determine Future Global Period Reimbursement For Global Periods): yes

## 2022-07-07 NOTE — PROCEDURE: MEDICATION COUNSELING
73 Rifampin Pregnancy And Lactation Text: This medication is Pregnancy Category C and it isn't know if it is safe during pregnancy. It is also excreted in breast milk and should not be used if you are breast feeding.

## 2022-08-08 ENCOUNTER — PATIENT MESSAGE (OUTPATIENT)
Dept: OBGYN CLINIC | Age: 62
End: 2022-08-08

## 2022-08-10 ENCOUNTER — OFFICE VISIT (OUTPATIENT)
Dept: OBGYN CLINIC | Age: 62
End: 2022-08-10

## 2022-08-10 VITALS
DIASTOLIC BLOOD PRESSURE: 77 MMHG | HEIGHT: 61 IN | WEIGHT: 148.2 LBS | HEART RATE: 73 BPM | SYSTOLIC BLOOD PRESSURE: 143 MMHG | BODY MASS INDEX: 27.98 KG/M2

## 2022-08-10 DIAGNOSIS — Z01.419 ENCOUNTER FOR GYNECOLOGICAL EXAMINATION (GENERAL) (ROUTINE) WITHOUT ABNORMAL FINDINGS: Primary | ICD-10-CM

## 2022-08-10 PROCEDURE — 99396 PREV VISIT EST AGE 40-64: CPT | Performed by: OBSTETRICS & GYNECOLOGY

## 2022-08-10 NOTE — PROGRESS NOTES
[address] [facility]  [email or web address]  [phone]    Noemi Alexander MD, [address] [facility]       Annual Gynecologic Exam:  [facility] s/p hyst  Chief Complaint   Patient presents with    Well Woman    Annette Girard is a 57 y.o.  [address] [facility]  female who is here for an annual exam.    She reports the following additional concerns: doing well, no concerns. Denies urinary sx or concerns for infection. Menstrual status:  Her periods are absent with a history of a hysterectomy. Contraception:  The current method of family planning is a hysterectomy. Preventive Medicine History:. Her most recent Pap smear was normal obtained   Her most recent HR HPV screen was Negative obtained [id number]   She reports she does not have a history of L[address] 2, 3 or cervical cancer. Breast health: The patient had a recent mammogram 2021 which was negative for malignancy. Past Medical History:   Diagnosis Date    Abnormal mammogram of left breast 2018; 18    add'l views MMG birads 2, left    Abnormal ultrasound of breast 2018    Left, breast cyst an 8 mm x 4 mm x 6 mm    Abnormal uterine bleeding     H/O mammogram 2019; 2020; 20    negative;  Negative BiRads-1, fibroglandular density; MMG normal/dense.       History of mammogram 13; 5/29/15; 17    negative; Negative; negative    HSV (herpes simplex virus) infection     oral     Hypothyroidism     Pap smear for cervical cancer screening 4/20/10,5/29/15    negative, HPV negative; Neg HPV + #16    Pap smear for cervical cancer screening 2016; 17; 7/3/18; 2021    negative, HPV negative; neg, hpv neg; Neg, HPV Neg; neg/hpv neg    PUD (peptic ulcer disease)     stomach ulcer     Past Surgical History:   Procedure Laterality Date    HX COLPOSCOPY  6/25/15    Pathology benign with inflammation    HX GYN  10/2010    partial hysterectomy,bladder prolapse     HX HERNIA REPAIR   ventral hernia repair    HX OTHER SURGICAL      liver biopsy, benign    HX OTHER SURGICAL      Laparoscopy, X 2, ovarian cyst    HX OTHER SURGICAL  9/30/11    Fatty Lymphoma Excision     Family History   Problem Relation Age of Onset    Hypertension Mother     Other Father         ETOH    Breast Cancer Sister         age 39, metastatic, negative BRCA testing    Other Other         lymphoma, uncle     Social History     Tobacco Use    Smoking status: Former    Smokeless tobacco: Never   Vaping Use    Vaping Use: Never used   Substance Use Topics    Alcohol use: Yes     Alcohol/week: 3.0 - 4.0 standard drinks     Types: 3 - 4 Glasses of wine per week     Comment: social    Drug use: No     Allergies   Allergen Reactions    Aspirin Other (comments)     \"stomach pain\"    Erythromycin Rash     In childhood    Penicillins Rash     As a child    Tetracycline Nausea and Vomiting     Current Outpatient Medications   Medication Sig    valACYclovir (VALTREX) 1 gram tablet Take one tablet by mouth every 12 hours x1 day prn    Nature-Throid 65 mg tab Take 65 mg by mouth daily. hydroCHLOROthiazide (HYDRODIURIL) 12.5 mg tablet Take 12.5 mg by mouth daily. losartan (COZAAR) 25 mg tablet Take 25 mg by mouth daily. LINACLOTIDE (LINZESS PO) Take  by mouth. PV W-O BENITEZ/FERROUS FUMARATE/FA (M-VIT PO) Take 1 Tab by mouth daily. No current facility-administered medications for this visit. Tobacco History:  reports that she has quit smoking. She has never used smokeless tobacco.  Alcohol Abuse:  reports current alcohol use of about 3.0 - 4.0 standard drinks per week. Drug Abuse:  reports no history of drug use.     Patient Active Problem List   Diagnosis Code    Meniscus tear S83.209A    HSV infection B00.9       Review of Systems - History obtained from the patient and patient filled out questionnaire   Constitutional/general, HEENT, CV, Resp, GI, MSK, Neuro, Psych, Heme/lymph, Skin, Breast ROS: no significant complaints except as noted on HPI    Physical Exam  Visit Vitals  BP (!) 143/77   Pulse 73   Ht 5' 1\" (1.549 m)   Wt 148 lb 3.2 oz (67.2 kg)   BMI 28.00 kg/m²       Constitutional  Appearance: well-nourished, well developed, alert, in no acute distress    HENT  Head and Face: appears normal    Neck  Inspection/Palpation: normal appearance, no masses or tenderness  Lymph Nodes: no lymphadenopathy present  Thyroid: gland size normal, nontender, no nodules or masses present on palpation    Chest  Respiratory Effort: breathing unlabored  Auscultation: normal breath sounds    Cardiovascular  Heart:   Auscultation: regular rate and rhythm without murmur    Breasts  Inspection of Breasts: breasts symmetrical, no skin changes, no discharge present, nipple appearance normal, no skin retraction present  Palpation of Breasts and Axillae: no masses present on palpation, no breast tenderness  Axillary Lymph Nodes: no lymphadenopathy present    Gastrointestinal  Abdominal Examination: abdomen non-tender to palpation, normal bowel sounds, no masses present  Liver and spleen: no hepatomegaly present, spleen not palpable  Hernias: no hernias identified    Genitourinary  External Genitalia: normal appearance for age, no discharge present, no tenderness present, no inflammatory lesions present, no masses present, without atrophy present  Vagina: normal vaginal vault without central or paravaginal defects, no discharge present, no inflammatory lesions present, no masses present  Bladder: non-tender to palpation  Urethra: appears normal  Uterus: absent  Adnexa: no adnexal tenderness present, no adnexal masses present  Perineum: perineum within normal limits, no evidence of trauma, no rashes or skin lesions present  Anus: anus within normal limits, no hemorrhoids present  Inguinal Lymph Nodes: no lymphadenopathy present    Skin  General Inspection: no rash, no lesions identified    Neurologic/Psychiatric  Mental Status:  Orientation: grossly oriented to person, place and time  Mood and Affect: mood normal, affect appropriate    Assessment:  57 y.o. for well woman exam  History of hysterectomy  Encounter Diagnosis   Name Primary? Encounter for gynecological examination (general) (routine) without abnormal findings Yes       Plan:  The patient was counseled about healthy lifestyle, disease prevention, signs and symptoms of breast cancer and bone protection. Handouts were given to the patient. She is instructed to contact our office with any questions or concerns  I recommend following up for her annual well woman exam in 1 year or following up sooner if needed  I recommend follow up with a primary care physician for chronic medical problems and evaluation of non-gynecologic concerns and to please contact our office with any GYN questions or concerns. Disc BP; pt stressed about MMG, rec BP log and PCP fu        No orders of the defined types were placed in this encounter. Results for orders placed or performed during the hospital encounter of 08/10/22   [facility] MAMMO BI SCREENING INCL CAD    Narrative    STUDY: Bilateral digital screening mammogram    INDICATION:  Screening. COMPARISON: 7/27/2021 and 6/1/2017    BREAST COMPOSITION:  There are scattered areas of fibroglandular density. FINDINGS: Bilateral digital screening mammography was performed and is  interpreted in conjunction with a computer assisted detection (CAD)   system. No  suspicious masses or calcifications are identified. There has been no  significant change. Impression    BI-RADS 1: Negative. No mammographic evidence of malignancy. RECOMMENDATIONS:  Next screening mammogram is recommended in one year. The patient will be notified of these results.

## 2023-08-17 ENCOUNTER — OFFICE VISIT (OUTPATIENT)
Age: 63
End: 2023-08-17
Payer: COMMERCIAL

## 2023-08-17 VITALS — SYSTOLIC BLOOD PRESSURE: 137 MMHG | WEIGHT: 140 LBS | BODY MASS INDEX: 26.45 KG/M2 | DIASTOLIC BLOOD PRESSURE: 80 MMHG

## 2023-08-17 DIAGNOSIS — Z01.419 ENCOUNTER FOR GYNECOLOGICAL EXAMINATION (GENERAL) (ROUTINE) WITHOUT ABNORMAL FINDINGS: Primary | ICD-10-CM

## 2023-08-17 PROCEDURE — 99396 PREV VISIT EST AGE 40-64: CPT | Performed by: OBSTETRICS & GYNECOLOGY

## 2023-08-17 RX ORDER — VALACYCLOVIR HYDROCHLORIDE 1 G/1
TABLET, FILM COATED ORAL
Qty: 30 TABLET | Refills: 1 | Status: SHIPPED | OUTPATIENT
Start: 2023-08-17

## 2023-08-17 NOTE — PROGRESS NOTES
Demetrius Wren is a 60 y.o. female returns for an annual exam     Chief Complaint   Patient presents with    Annual Exam       No LMP recorded. (Menstrual status: Menopause). Her periods are absent  She does not have dysmenorrhea. Problems: no problems  Birth Control: post menopausal status. Last Pap: normal obtained 2 year(s) ago. She does not have a history of KUNAL 2, 3 or cervical cancer. Last Mammogram: had her mammogram today in our office. It was see report. Last Bone Density: normal obtained 2 year(s) ago. Last colonoscopy: see report obtained patient unsure of when        Examination chaperoned by Jermaine Howard LPN.

## 2023-08-17 NOTE — PROGRESS NOTES
[address] [facility]  [email or web address]  [phone]    Bill Vincent MD, [id number] [facility]        Annual Gynecologic Exam:  Chief Complaint   Patient presents with    Annual Exam       Nancy Hodge is a [id number],  60 y.o. female   No LMP recorded. (Menstrual status: Menopause). She presents for her annual checkup. She is having problems - working on core strength. Per Rooming Note:  No LMP recorded. (Menstrual status: Menopause). Her periods are absent  She does not have dysmenorrhea. Problems: no problems  Birth Control: post menopausal status. Last Pap: normal obtained 2 year(s) ago. She does not have a history of KUNAL 2, 3 or cervical cancer. Last Mammogram: had her mammogram today in our office. It was see report. Last Bone Density: normal obtained 2 year(s) ago. Last colonoscopy: see report obtained patient unsure of when    Sexual history and Contraception:    Social History     Substance and Sexual Activity   Sexual Activity Not on file    Comment: hysterectomy      Past Medical History:   Diagnosis Date    Abnormal mammogram of left breast 07/03/2018; 7/9/18    add'l views [facility] birads 2, left    Abnormal ultrasound of breast 07/09/2018    Left, breast cyst an 8 mm x 4 mm x 6 mm    Abnormal uterine bleeding     H/O mammogram 07/16/2019; 07/21/2020; 07/27/20    negative;  Negative BiRads-1, fibroglandular density; MM[facility] normal/dense.       H/O mammogram 08/10/2022    normal    History of mammogram 5/22/13; 5/29/15; 6/1/17    negative; Negative; negative    HSV (herpes simplex virus) infection     oral     Hypothyroidism     Pap smear for cervical cancer screening 4/20/10,5/29/15    negative, HPV negative; Neg HPV + #16    Pap smear for cervical cancer screening 05/31/2016; 6/1/17; 7/3/18; 7/2021    negative, HPV negative; neg, hpv neg; Neg, HPV Neg; neg/hpv neg    PUD (peptic ulcer disease) 1979    stomach ulcer     Current Outpatient Medications   Medication Sig Dispense Refill

## 2023-08-18 ENCOUNTER — TELEPHONE (OUTPATIENT)
Age: 63
End: 2023-08-18

## 2023-08-18 NOTE — TELEPHONE ENCOUNTER
Two patient identifiers used    60year old patient seen in the office yesterday    Patient reports her sister  of breast cancer    Patient is calling , wondering about the results , and nervous since her sister passed from breast cancer    Patient was advised that the imaging results are still in process and to watch her my chart for the results      Patient verbalized understanding.

## 2023-08-21 ENCOUNTER — TELEPHONE (OUTPATIENT)
Age: 63
End: 2023-08-21

## 2023-08-21 NOTE — TELEPHONE ENCOUNTER
Pt calling back to advise she declines to have breast consult with high risk breast specialist d/t her sister not having the gene. She also wanted to let you know she had a Colonoscopy 4/2015 and it was normal, per pt. She was advised she didn't have to get another one for 10 years.

## 2024-02-14 ENCOUNTER — APPOINTMENT (RX ONLY)
Dept: URBAN - NONMETROPOLITAN AREA CLINIC 1 | Facility: CLINIC | Age: 64
Setting detail: DERMATOLOGY
End: 2024-02-14

## 2024-02-14 DIAGNOSIS — L81.4 OTHER MELANIN HYPERPIGMENTATION: ICD-10-CM

## 2024-02-14 DIAGNOSIS — L82.0 INFLAMED SEBORRHEIC KERATOSIS: ICD-10-CM

## 2024-02-14 DIAGNOSIS — L82.1 OTHER SEBORRHEIC KERATOSIS: ICD-10-CM

## 2024-02-14 DIAGNOSIS — D22 MELANOCYTIC NEVI: ICD-10-CM

## 2024-02-14 DIAGNOSIS — D18.0 HEMANGIOMA: ICD-10-CM

## 2024-02-14 DIAGNOSIS — L57.8 OTHER SKIN CHANGES DUE TO CHRONIC EXPOSURE TO NONIONIZING RADIATION: ICD-10-CM | Status: STABLE

## 2024-02-14 PROBLEM — D22.5 MELANOCYTIC NEVI OF TRUNK: Status: ACTIVE | Noted: 2024-02-14

## 2024-02-14 PROBLEM — D18.01 HEMANGIOMA OF SKIN AND SUBCUTANEOUS TISSUE: Status: ACTIVE | Noted: 2024-02-14

## 2024-02-14 PROCEDURE — ? SUNSCREEN RECOMMENDATIONS

## 2024-02-14 PROCEDURE — ? COUNSELING

## 2024-02-14 PROCEDURE — ? MDM - TREATMENT GOALS

## 2024-02-14 PROCEDURE — 17110 DESTRUCTION B9 LES UP TO 14: CPT

## 2024-02-14 PROCEDURE — 99213 OFFICE O/P EST LOW 20 MIN: CPT | Mod: 25

## 2024-02-14 PROCEDURE — ? FULL BODY SKIN EXAM

## 2024-02-14 PROCEDURE — ? TREATMENT REGIMEN

## 2024-02-14 PROCEDURE — ? LIQUID NITROGEN

## 2024-02-14 ASSESSMENT — LOCATION DETAILED DESCRIPTION DERM
LOCATION DETAILED: LEFT CENTRAL FRONTAL SCALP
LOCATION DETAILED: RIGHT PROXIMAL RADIAL DORSAL FOREARM
LOCATION DETAILED: LEFT SUPERIOR MEDIAL MIDBACK
LOCATION DETAILED: LEFT INFERIOR LATERAL FOREHEAD
LOCATION DETAILED: RIGHT MEDIAL SUPERIOR CHEST
LOCATION DETAILED: LEFT PROXIMAL DORSAL FOREARM
LOCATION DETAILED: LEFT INFERIOR MEDIAL UPPER BACK
LOCATION DETAILED: RIGHT SUPERIOR MEDIAL FOREHEAD
LOCATION DETAILED: RIGHT CENTRAL FRONTAL SCALP

## 2024-02-14 ASSESSMENT — LOCATION SIMPLE DESCRIPTION DERM
LOCATION SIMPLE: LEFT LOWER BACK
LOCATION SIMPLE: LEFT FOREARM
LOCATION SIMPLE: LEFT UPPER BACK
LOCATION SIMPLE: RIGHT FOREHEAD
LOCATION SIMPLE: SCALP
LOCATION SIMPLE: LEFT FOREHEAD
LOCATION SIMPLE: RIGHT FOREARM
LOCATION SIMPLE: CHEST

## 2024-02-14 ASSESSMENT — LOCATION ZONE DERM
LOCATION ZONE: ARM
LOCATION ZONE: FACE
LOCATION ZONE: TRUNK
LOCATION ZONE: SCALP

## 2024-02-14 NOTE — PROCEDURE: LIQUID NITROGEN
Include Z78.9 (Other Specified Conditions Influencing Health Status) As An Associated Diagnosis?: No
Show Applicator Variable?: Yes
Consent: The patient's consent was obtained including but not limited to risks of crusting, scabbing, blistering, scarring, darker or lighter pigmentary change, recurrence, incomplete removal and infection.
Medical Necessity Clause: This procedure was medically necessary because the lesions that were treated were:
Detail Level: Detailed
Post-Care Instructions: I reviewed with the patient in detail post-care instructions. Patient is to wear sunprotection, and avoid picking at any of the treated lesions. Pt may apply Vaseline to crusted or scabbing areas.
Spray Paint Text: The liquid nitrogen was applied to the skin utilizing a spray paint frosting technique.
Medical Necessity Information: It is in your best interest to select a reason for this procedure from the list below. All of these items fulfill various CMS LCD requirements except the new and changing color options.

## 2024-02-14 NOTE — PROCEDURE: TREATMENT REGIMEN
Plan: Apply sunscreen 30spf or greater every morning.
Detail Level: Detailed
Initiate Treatment: Recommended cerave SA, amlactin lotion, or aquaphor ointment to soften SK’s

## 2024-09-16 ENCOUNTER — OFFICE VISIT (OUTPATIENT)
Age: 64
End: 2024-09-16
Payer: COMMERCIAL

## 2024-09-16 VITALS
WEIGHT: 142 LBS | DIASTOLIC BLOOD PRESSURE: 73 MMHG | BODY MASS INDEX: 26.81 KG/M2 | SYSTOLIC BLOOD PRESSURE: 130 MMHG | HEART RATE: 86 BPM | HEIGHT: 61 IN

## 2024-09-16 DIAGNOSIS — B00.9 HSV INFECTION: ICD-10-CM

## 2024-09-16 DIAGNOSIS — Z12.4 CERVICAL CANCER SCREENING: ICD-10-CM

## 2024-09-16 DIAGNOSIS — Z01.419 ENCOUNTER FOR GYNECOLOGICAL EXAMINATION: Primary | ICD-10-CM

## 2024-09-16 DIAGNOSIS — E28.39 ESTROGEN DEFICIENCY: ICD-10-CM

## 2024-09-16 DIAGNOSIS — M85.80 OSTEOPENIA, UNSPECIFIED LOCATION: ICD-10-CM

## 2024-09-16 DIAGNOSIS — Z11.51 ENCOUNTER FOR SCREENING FOR HUMAN PAPILLOMAVIRUS (HPV): ICD-10-CM

## 2024-09-16 PROCEDURE — 99396 PREV VISIT EST AGE 40-64: CPT | Performed by: OBSTETRICS & GYNECOLOGY

## 2024-09-16 PROCEDURE — 99459 PELVIC EXAMINATION: CPT | Performed by: OBSTETRICS & GYNECOLOGY

## 2024-09-16 RX ORDER — LEVOTHYROXINE, LIOTHYRONINE 38; 9 UG/1; UG/1
60 TABLET ORAL EVERY MORNING
COMMUNITY
Start: 2024-08-14

## 2024-09-16 RX ORDER — VALACYCLOVIR HYDROCHLORIDE 1 G/1
TABLET, FILM COATED ORAL
Qty: 30 TABLET | Refills: 1 | Status: SHIPPED | OUTPATIENT
Start: 2024-09-16

## 2024-09-20 LAB
CYTOLOGIST CVX/VAG CYTO: NORMAL
CYTOLOGY CVX/VAG DOC CYTO: NORMAL
CYTOLOGY CVX/VAG DOC THIN PREP: NORMAL
DX ICD CODE: NORMAL
HPV GENOTYPE REFLEX: NORMAL
HPV I/H RISK 4 DNA CVX QL PROBE+SIG AMP: NEGATIVE
Lab: NORMAL
OTHER STN SPEC: NORMAL
STAT OF ADQ CVX/VAG CYTO-IMP: NORMAL

## 2025-06-05 ENCOUNTER — APPOINTMENT (OUTPATIENT)
Dept: URBAN - NONMETROPOLITAN AREA CLINIC 1 | Facility: CLINIC | Age: 65
Setting detail: DERMATOLOGY
End: 2025-06-05

## 2025-06-05 DIAGNOSIS — L57.8 OTHER SKIN CHANGES DUE TO CHRONIC EXPOSURE TO NONIONIZING RADIATION: ICD-10-CM | Status: STABLE

## 2025-06-05 DIAGNOSIS — D18.0 HEMANGIOMA: ICD-10-CM | Status: STABLE

## 2025-06-05 DIAGNOSIS — L81.4 OTHER MELANIN HYPERPIGMENTATION: ICD-10-CM | Status: STABLE

## 2025-06-05 DIAGNOSIS — D22 MELANOCYTIC NEVI: ICD-10-CM | Status: STABLE

## 2025-06-05 DIAGNOSIS — L82.1 OTHER SEBORRHEIC KERATOSIS: ICD-10-CM | Status: STABLE

## 2025-06-05 PROBLEM — D22.5 MELANOCYTIC NEVI OF TRUNK: Status: ACTIVE | Noted: 2025-06-05

## 2025-06-05 PROBLEM — D18.01 HEMANGIOMA OF SKIN AND SUBCUTANEOUS TISSUE: Status: ACTIVE | Noted: 2025-06-05

## 2025-06-05 PROCEDURE — ? SUNSCREEN RECOMMENDATIONS

## 2025-06-05 PROCEDURE — ? FULL BODY SKIN EXAM

## 2025-06-05 PROCEDURE — ? TREATMENT REGIMEN

## 2025-06-05 PROCEDURE — ? COUNSELING

## 2025-06-05 ASSESSMENT — LOCATION SIMPLE DESCRIPTION DERM
LOCATION SIMPLE: ABDOMEN
LOCATION SIMPLE: CHEST
LOCATION SIMPLE: LEFT UPPER BACK
LOCATION SIMPLE: UPPER BACK

## 2025-06-05 ASSESSMENT — LOCATION DETAILED DESCRIPTION DERM
LOCATION DETAILED: LEFT INFERIOR UPPER BACK
LOCATION DETAILED: LEFT MEDIAL UPPER BACK
LOCATION DETAILED: EPIGASTRIC SKIN
LOCATION DETAILED: SUPERIOR THORACIC SPINE
LOCATION DETAILED: RIGHT MEDIAL SUPERIOR CHEST

## 2025-06-05 ASSESSMENT — LOCATION ZONE DERM: LOCATION ZONE: TRUNK
